# Patient Record
Sex: MALE | Race: WHITE | NOT HISPANIC OR LATINO | Employment: UNEMPLOYED | URBAN - METROPOLITAN AREA
[De-identification: names, ages, dates, MRNs, and addresses within clinical notes are randomized per-mention and may not be internally consistent; named-entity substitution may affect disease eponyms.]

---

## 2018-03-15 ENCOUNTER — OFFICE VISIT (OUTPATIENT)
Dept: URGENT CARE | Facility: CLINIC | Age: 58
End: 2018-03-15
Payer: MEDICARE

## 2018-03-15 ENCOUNTER — APPOINTMENT (OUTPATIENT)
Dept: RADIOLOGY | Facility: MEDICAL CENTER | Age: 58
DRG: 638 | End: 2018-03-15
Attending: EMERGENCY MEDICINE
Payer: MEDICARE

## 2018-03-15 ENCOUNTER — HOSPITAL ENCOUNTER (INPATIENT)
Facility: MEDICAL CENTER | Age: 58
LOS: 4 days | DRG: 638 | End: 2018-03-19
Attending: EMERGENCY MEDICINE | Admitting: INTERNAL MEDICINE
Payer: MEDICARE

## 2018-03-15 ENCOUNTER — RESOLUTE PROFESSIONAL BILLING HOSPITAL PROF FEE (OUTPATIENT)
Dept: HOSPITALIST | Facility: MEDICAL CENTER | Age: 58
End: 2018-03-15
Payer: MEDICARE

## 2018-03-15 VITALS
BODY MASS INDEX: 39.17 KG/M2 | HEART RATE: 115 BPM | TEMPERATURE: 98.3 F | OXYGEN SATURATION: 96 % | SYSTOLIC BLOOD PRESSURE: 118 MMHG | RESPIRATION RATE: 18 BRPM | HEIGHT: 75 IN | WEIGHT: 315 LBS | DIASTOLIC BLOOD PRESSURE: 74 MMHG

## 2018-03-15 DIAGNOSIS — R07.9 CHEST PAIN, UNSPECIFIED TYPE: ICD-10-CM

## 2018-03-15 DIAGNOSIS — E66.01 MORBID OBESITY WITH BMI OF 40.0-44.9, ADULT (HCC): ICD-10-CM

## 2018-03-15 DIAGNOSIS — E86.0 DEHYDRATION: ICD-10-CM

## 2018-03-15 DIAGNOSIS — I44.0 1ST DEGREE AV BLOCK: ICD-10-CM

## 2018-03-15 DIAGNOSIS — R06.02 SHORTNESS OF BREATH: ICD-10-CM

## 2018-03-15 DIAGNOSIS — R06.09 DOE (DYSPNEA ON EXERTION): ICD-10-CM

## 2018-03-15 PROBLEM — E11.10 DIABETIC KETOACIDOSIS WITHOUT COMA ASSOCIATED WITH TYPE 2 DIABETES MELLITUS (HCC): Status: ACTIVE | Noted: 2018-03-15

## 2018-03-15 PROBLEM — I10 ESSENTIAL HYPERTENSION: Status: ACTIVE | Noted: 2018-03-15

## 2018-03-15 PROBLEM — E87.1 HYPONATREMIA: Status: ACTIVE | Noted: 2018-03-15

## 2018-03-15 PROBLEM — E10.10 DIABETIC KETOACIDOSIS WITHOUT COMA ASSOCIATED WITH TYPE 1 DIABETES MELLITUS (HCC): Status: ACTIVE | Noted: 2018-03-15

## 2018-03-15 PROBLEM — D72.829 LEUKOCYTOSIS: Status: ACTIVE | Noted: 2018-03-15

## 2018-03-15 LAB
ALBUMIN SERPL BCP-MCNC: 3.8 G/DL (ref 3.2–4.9)
ALBUMIN SERPL BCP-MCNC: 4.4 G/DL (ref 3.2–4.9)
ALBUMIN/GLOB SERPL: 1.4 G/DL
ALBUMIN/GLOB SERPL: 1.6 G/DL
ALP SERPL-CCNC: 111 U/L (ref 30–99)
ALP SERPL-CCNC: 142 U/L (ref 30–99)
ALT SERPL-CCNC: 14 U/L (ref 2–50)
ALT SERPL-CCNC: 19 U/L (ref 2–50)
ANION GAP SERPL CALC-SCNC: 13 MMOL/L (ref 0–11.9)
ANION GAP SERPL CALC-SCNC: 15 MMOL/L (ref 0–11.9)
ANION GAP SERPL CALC-SCNC: 20 MMOL/L (ref 0–11.9)
AST SERPL-CCNC: 11 U/L (ref 12–45)
AST SERPL-CCNC: 9 U/L (ref 12–45)
B-OH-BUTYR SERPL-MCNC: 5.09 MMOL/L (ref 0.02–0.27)
BASOPHILS # BLD AUTO: 0.5 % (ref 0–1.8)
BASOPHILS # BLD AUTO: 0.5 % (ref 0–1.8)
BASOPHILS # BLD: 0.07 K/UL (ref 0–0.12)
BASOPHILS # BLD: 0.07 K/UL (ref 0–0.12)
BILIRUB SERPL-MCNC: 0.5 MG/DL (ref 0.1–1.5)
BILIRUB SERPL-MCNC: 0.5 MG/DL (ref 0.1–1.5)
BNP SERPL-MCNC: <2 PG/ML (ref 0–100)
BUN SERPL-MCNC: 16 MG/DL (ref 8–22)
BUN SERPL-MCNC: 16 MG/DL (ref 8–22)
BUN SERPL-MCNC: 17 MG/DL (ref 8–22)
CALCIUM SERPL-MCNC: 10.2 MG/DL (ref 8.5–10.5)
CALCIUM SERPL-MCNC: 9.5 MG/DL (ref 8.5–10.5)
CALCIUM SERPL-MCNC: 9.8 MG/DL (ref 8.5–10.5)
CHLORIDE SERPL-SCNC: 104 MMOL/L (ref 96–112)
CHLORIDE SERPL-SCNC: 106 MMOL/L (ref 96–112)
CHLORIDE SERPL-SCNC: 99 MMOL/L (ref 96–112)
CO2 SERPL-SCNC: 12 MMOL/L (ref 20–33)
CO2 SERPL-SCNC: 14 MMOL/L (ref 20–33)
CO2 SERPL-SCNC: 9 MMOL/L (ref 20–33)
CREAT SERPL-MCNC: 0.91 MG/DL (ref 0.5–1.4)
CREAT SERPL-MCNC: 0.95 MG/DL (ref 0.5–1.4)
CREAT SERPL-MCNC: 0.99 MG/DL (ref 0.5–1.4)
EKG IMPRESSION: NORMAL
EOSINOPHIL # BLD AUTO: 0.05 K/UL (ref 0–0.51)
EOSINOPHIL # BLD AUTO: 0.12 K/UL (ref 0–0.51)
EOSINOPHIL NFR BLD: 0.3 % (ref 0–6.9)
EOSINOPHIL NFR BLD: 0.9 % (ref 0–6.9)
ERYTHROCYTE [DISTWIDTH] IN BLOOD BY AUTOMATED COUNT: 39.1 FL (ref 35.9–50)
ERYTHROCYTE [DISTWIDTH] IN BLOOD BY AUTOMATED COUNT: 39.1 FL (ref 35.9–50)
EST. AVERAGE GLUCOSE BLD GHB EST-MCNC: 335 MG/DL
GLOBULIN SER CALC-MCNC: 2.7 G/DL (ref 1.9–3.5)
GLOBULIN SER CALC-MCNC: 2.8 G/DL (ref 1.9–3.5)
GLUCOSE BLD-MCNC: 122 MG/DL (ref 65–99)
GLUCOSE BLD-MCNC: 133 MG/DL (ref 65–99)
GLUCOSE BLD-MCNC: 145 MG/DL (ref 65–99)
GLUCOSE BLD-MCNC: 154 MG/DL (ref 65–99)
GLUCOSE BLD-MCNC: 180 MG/DL (ref 65–99)
GLUCOSE BLD-MCNC: 214 MG/DL (ref 65–99)
GLUCOSE BLD-MCNC: 238 MG/DL (ref 65–99)
GLUCOSE BLD-MCNC: 264 MG/DL (ref 65–99)
GLUCOSE SERPL-MCNC: 123 MG/DL (ref 65–99)
GLUCOSE SERPL-MCNC: 216 MG/DL (ref 65–99)
GLUCOSE SERPL-MCNC: 341 MG/DL (ref 65–99)
HBA1C MFR BLD: 13.3 % (ref 0–5.6)
HCT VFR BLD AUTO: 44.2 % (ref 42–52)
HCT VFR BLD AUTO: 48.3 % (ref 42–52)
HGB BLD-MCNC: 14.9 G/DL (ref 14–18)
HGB BLD-MCNC: 16.6 G/DL (ref 14–18)
IMM GRANULOCYTES # BLD AUTO: 0.07 K/UL (ref 0–0.11)
IMM GRANULOCYTES # BLD AUTO: 0.09 K/UL (ref 0–0.11)
IMM GRANULOCYTES NFR BLD AUTO: 0.5 % (ref 0–0.9)
IMM GRANULOCYTES NFR BLD AUTO: 0.6 % (ref 0–0.9)
LYMPHOCYTES # BLD AUTO: 3.72 K/UL (ref 1–4.8)
LYMPHOCYTES # BLD AUTO: 4.23 K/UL (ref 1–4.8)
LYMPHOCYTES NFR BLD: 24.6 % (ref 22–41)
LYMPHOCYTES NFR BLD: 30.6 % (ref 22–41)
MAGNESIUM SERPL-MCNC: 1.6 MG/DL (ref 1.5–2.5)
MCH RBC QN AUTO: 28.2 PG (ref 27–33)
MCH RBC QN AUTO: 29 PG (ref 27–33)
MCHC RBC AUTO-ENTMCNC: 33.7 G/DL (ref 33.7–35.3)
MCHC RBC AUTO-ENTMCNC: 34.4 G/DL (ref 33.7–35.3)
MCV RBC AUTO: 83.6 FL (ref 81.4–97.8)
MCV RBC AUTO: 84.4 FL (ref 81.4–97.8)
MONOCYTES # BLD AUTO: 0.87 K/UL (ref 0–0.85)
MONOCYTES # BLD AUTO: 1.17 K/UL (ref 0–0.85)
MONOCYTES NFR BLD AUTO: 5.8 % (ref 0–13.4)
MONOCYTES NFR BLD AUTO: 8.5 % (ref 0–13.4)
NEUTROPHILS # BLD AUTO: 10.33 K/UL (ref 1.82–7.42)
NEUTROPHILS # BLD AUTO: 8.15 K/UL (ref 1.82–7.42)
NEUTROPHILS NFR BLD: 59 % (ref 44–72)
NEUTROPHILS NFR BLD: 68.2 % (ref 44–72)
NRBC # BLD AUTO: 0 K/UL
NRBC # BLD AUTO: 0 K/UL
NRBC BLD-RTO: 0 /100 WBC
NRBC BLD-RTO: 0 /100 WBC
PHOSPHATE SERPL-MCNC: 1.7 MG/DL (ref 2.5–4.5)
PLATELET # BLD AUTO: 363 K/UL (ref 164–446)
PLATELET # BLD AUTO: 394 K/UL (ref 164–446)
PMV BLD AUTO: 10 FL (ref 9–12.9)
PMV BLD AUTO: 10.4 FL (ref 9–12.9)
POTASSIUM SERPL-SCNC: 3.6 MMOL/L (ref 3.6–5.5)
POTASSIUM SERPL-SCNC: 3.7 MMOL/L (ref 3.6–5.5)
POTASSIUM SERPL-SCNC: 4 MMOL/L (ref 3.6–5.5)
PROT SERPL-MCNC: 6.5 G/DL (ref 6–8.2)
PROT SERPL-MCNC: 7.2 G/DL (ref 6–8.2)
RBC # BLD AUTO: 5.29 M/UL (ref 4.7–6.1)
RBC # BLD AUTO: 5.72 M/UL (ref 4.7–6.1)
SODIUM SERPL-SCNC: 128 MMOL/L (ref 135–145)
SODIUM SERPL-SCNC: 131 MMOL/L (ref 135–145)
SODIUM SERPL-SCNC: 133 MMOL/L (ref 135–145)
TROPONIN I SERPL-MCNC: <0.01 NG/ML (ref 0–0.04)
TSH SERPL DL<=0.005 MIU/L-ACNC: 1.46 UIU/ML (ref 0.38–5.33)
WBC # BLD AUTO: 13.8 K/UL (ref 4.8–10.8)
WBC # BLD AUTO: 15.1 K/UL (ref 4.8–10.8)

## 2018-03-15 PROCEDURE — 99204 OFFICE O/P NEW MOD 45 MIN: CPT | Performed by: NURSE PRACTITIONER

## 2018-03-15 PROCEDURE — 71045 X-RAY EXAM CHEST 1 VIEW: CPT

## 2018-03-15 PROCEDURE — 36415 COLL VENOUS BLD VENIPUNCTURE: CPT

## 2018-03-15 PROCEDURE — 700102 HCHG RX REV CODE 250 W/ 637 OVERRIDE(OP): Performed by: EMERGENCY MEDICINE

## 2018-03-15 PROCEDURE — 80048 BASIC METABOLIC PNL TOTAL CA: CPT

## 2018-03-15 PROCEDURE — 80053 COMPREHEN METABOLIC PANEL: CPT

## 2018-03-15 PROCEDURE — 700105 HCHG RX REV CODE 258: Performed by: INTERNAL MEDICINE

## 2018-03-15 PROCEDURE — 700105 HCHG RX REV CODE 258: Performed by: EMERGENCY MEDICINE

## 2018-03-15 PROCEDURE — 83036 HEMOGLOBIN GLYCOSYLATED A1C: CPT

## 2018-03-15 PROCEDURE — 84443 ASSAY THYROID STIM HORMONE: CPT

## 2018-03-15 PROCEDURE — 93010 ELECTROCARDIOGRAM REPORT: CPT | Performed by: INTERNAL MEDICINE

## 2018-03-15 PROCEDURE — 96365 THER/PROPH/DIAG IV INF INIT: CPT

## 2018-03-15 PROCEDURE — A9270 NON-COVERED ITEM OR SERVICE: HCPCS | Performed by: EMERGENCY MEDICINE

## 2018-03-15 PROCEDURE — 82962 GLUCOSE BLOOD TEST: CPT | Mod: 91

## 2018-03-15 PROCEDURE — 700111 HCHG RX REV CODE 636 W/ 250 OVERRIDE (IP): Performed by: INTERNAL MEDICINE

## 2018-03-15 PROCEDURE — 93005 ELECTROCARDIOGRAM TRACING: CPT

## 2018-03-15 PROCEDURE — A9270 NON-COVERED ITEM OR SERVICE: HCPCS | Performed by: INTERNAL MEDICINE

## 2018-03-15 PROCEDURE — 700101 HCHG RX REV CODE 250: Performed by: INTERNAL MEDICINE

## 2018-03-15 PROCEDURE — 770022 HCHG ROOM/CARE - ICU (200)

## 2018-03-15 PROCEDURE — 71275 CT ANGIOGRAPHY CHEST: CPT

## 2018-03-15 PROCEDURE — 93005 ELECTROCARDIOGRAM TRACING: CPT | Performed by: EMERGENCY MEDICINE

## 2018-03-15 PROCEDURE — 84484 ASSAY OF TROPONIN QUANT: CPT | Mod: 91

## 2018-03-15 PROCEDURE — 99291 CRITICAL CARE FIRST HOUR: CPT

## 2018-03-15 PROCEDURE — 700111 HCHG RX REV CODE 636 W/ 250 OVERRIDE (IP): Performed by: EMERGENCY MEDICINE

## 2018-03-15 PROCEDURE — 700117 HCHG RX CONTRAST REV CODE 255: Performed by: EMERGENCY MEDICINE

## 2018-03-15 PROCEDURE — 96361 HYDRATE IV INFUSION ADD-ON: CPT

## 2018-03-15 PROCEDURE — 93005 ELECTROCARDIOGRAM TRACING: CPT | Performed by: INTERNAL MEDICINE

## 2018-03-15 PROCEDURE — 83880 ASSAY OF NATRIURETIC PEPTIDE: CPT

## 2018-03-15 PROCEDURE — 84100 ASSAY OF PHOSPHORUS: CPT

## 2018-03-15 PROCEDURE — 83735 ASSAY OF MAGNESIUM: CPT

## 2018-03-15 PROCEDURE — 700102 HCHG RX REV CODE 250 W/ 637 OVERRIDE(OP): Performed by: INTERNAL MEDICINE

## 2018-03-15 PROCEDURE — 96366 THER/PROPH/DIAG IV INF ADDON: CPT

## 2018-03-15 PROCEDURE — 82010 KETONE BODYS QUAN: CPT

## 2018-03-15 PROCEDURE — 85025 COMPLETE CBC W/AUTO DIFF WBC: CPT

## 2018-03-15 PROCEDURE — 99291 CRITICAL CARE FIRST HOUR: CPT | Performed by: INTERNAL MEDICINE

## 2018-03-15 RX ORDER — ONDANSETRON 2 MG/ML
4 INJECTION INTRAMUSCULAR; INTRAVENOUS EVERY 4 HOURS PRN
Status: DISCONTINUED | OUTPATIENT
Start: 2018-03-15 | End: 2018-03-19 | Stop reason: HOSPADM

## 2018-03-15 RX ORDER — AMOXICILLIN 250 MG
2 CAPSULE ORAL 2 TIMES DAILY
Status: DISCONTINUED | OUTPATIENT
Start: 2018-03-15 | End: 2018-03-19 | Stop reason: HOSPADM

## 2018-03-15 RX ORDER — ACETAMINOPHEN 325 MG/1
650 TABLET ORAL EVERY 6 HOURS PRN
Status: DISCONTINUED | OUTPATIENT
Start: 2018-03-15 | End: 2018-03-19 | Stop reason: HOSPADM

## 2018-03-15 RX ORDER — ASPIRIN 81 MG/1
324 TABLET, CHEWABLE ORAL DAILY
Status: DISCONTINUED | OUTPATIENT
Start: 2018-03-15 | End: 2018-03-19 | Stop reason: HOSPADM

## 2018-03-15 RX ORDER — LISINOPRIL 5 MG/1
10 TABLET ORAL EVERY EVENING
Status: DISCONTINUED | OUTPATIENT
Start: 2018-03-15 | End: 2018-03-19 | Stop reason: HOSPADM

## 2018-03-15 RX ORDER — OXYCODONE AND ACETAMINOPHEN 7.5; 325 MG/1; MG/1
1 TABLET ORAL EVERY 4 HOURS PRN
COMMUNITY
End: 2018-03-15

## 2018-03-15 RX ORDER — OXYCODONE HCL 40 MG/1
40 TABLET, FILM COATED, EXTENDED RELEASE ORAL EVERY 8 HOURS
COMMUNITY
End: 2018-05-18 | Stop reason: SDUPTHER

## 2018-03-15 RX ORDER — ASPIRIN 325 MG
325 TABLET ORAL DAILY
Status: DISCONTINUED | OUTPATIENT
Start: 2018-03-15 | End: 2018-03-19 | Stop reason: HOSPADM

## 2018-03-15 RX ORDER — SODIUM CHLORIDE 9 MG/ML
1000 INJECTION, SOLUTION INTRAVENOUS ONCE
Status: COMPLETED | OUTPATIENT
Start: 2018-03-15 | End: 2018-03-15

## 2018-03-15 RX ORDER — OXYCODONE HYDROCHLORIDE 10 MG/1
10 TABLET ORAL ONCE
Status: COMPLETED | OUTPATIENT
Start: 2018-03-15 | End: 2018-03-15

## 2018-03-15 RX ORDER — MAGNESIUM SULFATE HEPTAHYDRATE 40 MG/ML
2 INJECTION, SOLUTION INTRAVENOUS
Status: COMPLETED | OUTPATIENT
Start: 2018-03-15 | End: 2018-03-16

## 2018-03-15 RX ORDER — OMEPRAZOLE 20 MG/1
20 CAPSULE, DELAYED RELEASE ORAL
Status: DISCONTINUED | OUTPATIENT
Start: 2018-03-16 | End: 2018-03-19 | Stop reason: HOSPADM

## 2018-03-15 RX ORDER — ONDANSETRON 4 MG/1
4 TABLET, ORALLY DISINTEGRATING ORAL EVERY 4 HOURS PRN
Status: DISCONTINUED | OUTPATIENT
Start: 2018-03-15 | End: 2018-03-19 | Stop reason: HOSPADM

## 2018-03-15 RX ORDER — LISINOPRIL 10 MG/1
10 TABLET ORAL EVERY EVENING
COMMUNITY

## 2018-03-15 RX ORDER — SODIUM CHLORIDE 9 MG/ML
INJECTION, SOLUTION INTRAVENOUS CONTINUOUS
Status: DISCONTINUED | OUTPATIENT
Start: 2018-03-15 | End: 2018-03-15

## 2018-03-15 RX ORDER — ASPIRIN 300 MG/1
300 SUPPOSITORY RECTAL DAILY
Status: DISCONTINUED | OUTPATIENT
Start: 2018-03-15 | End: 2018-03-19 | Stop reason: HOSPADM

## 2018-03-15 RX ORDER — BISACODYL 10 MG
10 SUPPOSITORY, RECTAL RECTAL
Status: DISCONTINUED | OUTPATIENT
Start: 2018-03-15 | End: 2018-03-19 | Stop reason: HOSPADM

## 2018-03-15 RX ORDER — DEXTROSE AND SODIUM CHLORIDE 10; .45 G/100ML; G/100ML
INJECTION, SOLUTION INTRAVENOUS CONTINUOUS
Status: ACTIVE | OUTPATIENT
Start: 2018-03-15 | End: 2018-03-16

## 2018-03-15 RX ORDER — HYDROCODONE BITARTRATE AND ACETAMINOPHEN 5; 325 MG/1; MG/1
1 TABLET ORAL EVERY 8 HOURS PRN
COMMUNITY
End: 2018-05-18 | Stop reason: SDUPTHER

## 2018-03-15 RX ORDER — POLYETHYLENE GLYCOL 3350 17 G/17G
1 POWDER, FOR SOLUTION ORAL
Status: DISCONTINUED | OUTPATIENT
Start: 2018-03-15 | End: 2018-03-19 | Stop reason: HOSPADM

## 2018-03-15 RX ORDER — ESOMEPRAZOLE MAGNESIUM 40 MG/1
40 CAPSULE, DELAYED RELEASE ORAL
COMMUNITY

## 2018-03-15 RX ORDER — AZITHROMYCIN 250 MG/1
250-500 TABLET, FILM COATED ORAL DAILY
COMMUNITY
Start: 2018-03-02 | End: 2018-04-27

## 2018-03-15 RX ORDER — PROMETHAZINE HYDROCHLORIDE 25 MG/1
12.5-25 TABLET ORAL EVERY 4 HOURS PRN
Status: DISCONTINUED | OUTPATIENT
Start: 2018-03-15 | End: 2018-03-19 | Stop reason: HOSPADM

## 2018-03-15 RX ORDER — OXYCODONE HCL 40 MG/1
40 TABLET, FILM COATED, EXTENDED RELEASE ORAL EVERY 8 HOURS
Status: DISCONTINUED | OUTPATIENT
Start: 2018-03-15 | End: 2018-03-19 | Stop reason: HOSPADM

## 2018-03-15 RX ORDER — MAGNESIUM SULFATE HEPTAHYDRATE 40 MG/ML
4 INJECTION, SOLUTION INTRAVENOUS
Status: COMPLETED | OUTPATIENT
Start: 2018-03-15 | End: 2018-03-16

## 2018-03-15 RX ORDER — HEPARIN SODIUM 5000 [USP'U]/ML
5000 INJECTION, SOLUTION INTRAVENOUS; SUBCUTANEOUS EVERY 8 HOURS
Status: DISCONTINUED | OUTPATIENT
Start: 2018-03-15 | End: 2018-03-19 | Stop reason: HOSPADM

## 2018-03-15 RX ORDER — SODIUM CHLORIDE 450 MG/100ML
INJECTION, SOLUTION INTRAVENOUS CONTINUOUS
Status: DISCONTINUED | OUTPATIENT
Start: 2018-03-15 | End: 2018-03-16

## 2018-03-15 RX ORDER — DEXTROSE AND SODIUM CHLORIDE 5; .45 G/100ML; G/100ML
INJECTION, SOLUTION INTRAVENOUS CONTINUOUS
Status: DISCONTINUED | OUTPATIENT
Start: 2018-03-15 | End: 2018-03-16

## 2018-03-15 RX ORDER — DEXTROSE MONOHYDRATE 25 G/50ML
25 INJECTION, SOLUTION INTRAVENOUS
Status: DISCONTINUED | OUTPATIENT
Start: 2018-03-15 | End: 2018-03-16

## 2018-03-15 RX ORDER — SODIUM CHLORIDE 9 MG/ML
2000 INJECTION, SOLUTION INTRAVENOUS ONCE
Status: DISCONTINUED | OUTPATIENT
Start: 2018-03-15 | End: 2018-03-15

## 2018-03-15 RX ORDER — PROMETHAZINE HYDROCHLORIDE 25 MG/1
12.5-25 SUPPOSITORY RECTAL EVERY 4 HOURS PRN
Status: DISCONTINUED | OUTPATIENT
Start: 2018-03-15 | End: 2018-03-19 | Stop reason: HOSPADM

## 2018-03-15 RX ADMIN — DEXTROSE AND SODIUM CHLORIDE: 5; 450 INJECTION, SOLUTION INTRAVENOUS at 16:07

## 2018-03-15 RX ADMIN — OXYCODONE HYDROCHLORIDE 10 MG: 10 TABLET ORAL at 14:43

## 2018-03-15 RX ADMIN — MAGNESIUM SULFATE IN WATER 2 G: 40 INJECTION, SOLUTION INTRAVENOUS at 22:34

## 2018-03-15 RX ADMIN — DEXTROSE AND SODIUM CHLORIDE: 10; .45 INJECTION, SOLUTION INTRAVENOUS at 20:34

## 2018-03-15 RX ADMIN — SODIUM CHLORIDE 1000 ML: 9 INJECTION, SOLUTION INTRAVENOUS at 12:18

## 2018-03-15 RX ADMIN — POTASSIUM CHLORIDE 20 MEQ: 2 INJECTION, SOLUTION, CONCENTRATE INTRAVENOUS at 15:38

## 2018-03-15 RX ADMIN — SODIUM CHLORIDE 6 UNITS/HR: 9 INJECTION, SOLUTION INTRAVENOUS at 14:57

## 2018-03-15 RX ADMIN — SODIUM CHLORIDE 6 UNITS/HR: 9 INJECTION, SOLUTION INTRAVENOUS at 16:11

## 2018-03-15 RX ADMIN — IOHEXOL: 350 INJECTION, SOLUTION INTRAVENOUS at 13:18

## 2018-03-15 RX ADMIN — HEPARIN SODIUM 5000 UNITS: 5000 INJECTION, SOLUTION INTRAVENOUS; SUBCUTANEOUS at 22:17

## 2018-03-15 RX ADMIN — ASPIRIN 325 MG: 325 TABLET ORAL at 16:25

## 2018-03-15 RX ADMIN — OXYCODONE HYDROCHLORIDE 40 MG: 40 TABLET, FILM COATED, EXTENDED RELEASE ORAL at 16:25

## 2018-03-15 RX ADMIN — OXYCODONE HYDROCHLORIDE 40 MG: 40 TABLET, FILM COATED, EXTENDED RELEASE ORAL at 22:16

## 2018-03-15 RX ADMIN — LISINOPRIL 10 MG: 5 TABLET ORAL at 22:16

## 2018-03-15 RX ADMIN — POTASSIUM PHOSPHATE, MONOBASIC AND POTASSIUM PHOSPHATE, DIBASIC 30 MMOL: 224; 236 INJECTION, SOLUTION INTRAVENOUS at 23:48

## 2018-03-15 ASSESSMENT — ENCOUNTER SYMPTOMS
NECK PAIN: 0
SHORTNESS OF BREATH: 1
NAUSEA: 0
LOWER EXTREMITY EDEMA: 0
MYALGIAS: 0
MYALGIAS: 0
PND: 0
DIZZINESS: 1
HEARTBURN: 0
DEPRESSION: 0
COUGH: 1
HEMOPTYSIS: 0
HEMOPTYSIS: 0
NAUSEA: 0
WEAKNESS: 1
ORTHOPNEA: 0
CHILLS: 0
CHILLS: 0
FEVER: 0
LEG PAIN: 0
DIZZINESS: 0
COUGH: 0
FOCAL WEAKNESS: 0
LOSS OF CONSCIOUSNESS: 0
NECK PAIN: 0
WEAKNESS: 0
VOMITING: 0
BRUISES/BLEEDS EASILY: 0
FEVER: 0
BLURRED VISION: 0
VOMITING: 0
ABDOMINAL PAIN: 0
CLAUDICATION: 0
IRREGULAR HEARTBEAT: 0
SHORTNESS OF BREATH: 1
SPUTUM PRODUCTION: 0
SYNCOPE: 1
SORE THROAT: 0
PALPITATIONS: 0
EXERTIONAL CHEST PRESSURE: 1
SPUTUM PRODUCTION: 0
ABDOMINAL PAIN: 0
EYE PAIN: 0
WEIGHT LOSS: 1
PALPITATIONS: 0

## 2018-03-15 ASSESSMENT — PATIENT HEALTH QUESTIONNAIRE - PHQ9
1. LITTLE INTEREST OR PLEASURE IN DOING THINGS: NOT AT ALL
SUM OF ALL RESPONSES TO PHQ9 QUESTIONS 1 AND 2: 0
SUM OF ALL RESPONSES TO PHQ QUESTIONS 1-9: 0
2. FEELING DOWN, DEPRESSED, IRRITABLE, OR HOPELESS: NOT AT ALL

## 2018-03-15 ASSESSMENT — COPD QUESTIONNAIRES
DO YOU EVER COUGH UP ANY MUCUS OR PHLEGM?: NO/ONLY WITH OCCASIONAL COLDS OR INFECTIONS
HAVE YOU SMOKED AT LEAST 100 CIGARETTES IN YOUR ENTIRE LIFE: NO/DON'T KNOW
COPD SCREENING SCORE: 1
DURING THE PAST 4 WEEKS HOW MUCH DID YOU FEEL SHORT OF BREATH: NONE/LITTLE OF THE TIME

## 2018-03-15 ASSESSMENT — LIFESTYLE VARIABLES
ALCOHOL_USE: NO
EVER_SMOKED: NEVER

## 2018-03-15 ASSESSMENT — PAIN SCALES - GENERAL
PAINLEVEL_OUTOF10: 3

## 2018-03-15 NOTE — ED NOTES
Med rec complete per Pt at bedside  Allergies reviewed  Pt completed a 5 day course of Azithromycin on 3/6

## 2018-03-15 NOTE — ED TRIAGE NOTES
Patient noted chest pain for the last two weeks, comes in today because it just won't go away, went to urgent care and sent here for further evaluation and work up.  EKG complete.  History of diabetes.  Positive nausea, pressure does not radiate, no vomiting.  No numbness, no tingling.

## 2018-03-15 NOTE — ED PROVIDER NOTES
"ED Provider Note    CHIEF COMPLAINT  Chief Complaint   Patient presents with   • Chest Pain       HPI  Dakota Cisneros is a 57 y.o. male with history of diabetes and hypertension who presents complaining of chest pain and shortness of breath.    Patient states he was walking up a neighbors flight of stairs with a box weighing approximately 50 pounds several weeks ago when he developed acute onset of shortness of breath approximately three quarters of the way up the flight. After resting for 5-10 minutes, his symptoms resolved but he noted that he had had urinary incontinence and near syncope prior to this. Following day he noted a constant, dull substernal chest pressure that was nonradiating and has been intermittent since. Patient reports dyspnea on exertion with fatigue that has been worsening and also accompanied by nausea. He notes a mild cough that is occasionally productive of yellowish green sputum and \"specks\" of possible blood.     Patient reports a 20 pound weight loss in the last month that is unintentional and states his appetite has been decreased due to nausea.  Reports feeling \"out of it.\"    Patient denies calf pain, leg swelling, orthopnea, PND, sick contacts, fever, chills, vomiting, diarrhea.      ALLERGIES  No Known Allergies    CURRENT MEDICATIONS  Home Medications     Reviewed by Harley Hargrove R.N. (Registered Nurse) on 03/15/18 at 1032  Med List Status: Complete   Medication Last Dose Status   Esomeprazole Magnesium (NEXIUM PO)  Active   HYDROcodone-acetaminophen (NORCO) 5-325 MG Tab per tablet  Active   oxyCODONE-acetaminophen (PERCOCET) 7.5-325 MG per tablet  Active                PAST MEDICAL HISTORY     Diabetes  Hypertension    SURGICAL HISTORY  patient denies any surgical history    SOCIAL HISTORY  Social History     Social History Main Topics   • Smoking status: Never Smoker   • Smokeless tobacco: Never Used   • Alcohol use Not on file   • Drug use: Unknown   • Sexual activity: Not on " "file       Family Hx:  No history of PE/DVT or ACS    REVIEW OF SYSTEMS  See HPI for further details.  All other systems are negative except as above in HPI.      PHYSICAL EXAM  VITAL SIGNS: /81   Pulse (!) 115   Temp 36.7 °C (98 °F)   Resp 20   Ht 1.905 m (6' 3\")   Wt (!) 136.8 kg (301 lb 9.4 oz)   SpO2 100%   BMI 37.70 kg/m²     General:  WDoverweight, nontoxic appearing in NAD; A+Ox3; V/S as above  Skin: warm and dry; good color; no rash  HEENT: NCAT; EOMs intact; PERRL; no scleral icterus   Neck: FROM; no meningismus, no JVD  Cardiovascular: Tachy heart rate and regular rhythm.  No murmurs, rubs, or gallops; pulses 2+ bilaterally radially  Lungs: Clear to auscultation with good air movement bilaterally.  No wheezes, rhonchi, or rales.   Abdomen: BS present; soft; NTND; no rebound, guarding, or rigidity.  No organomegaly or pulsatile mass  Extremities: VENEGAS x 4; no e/o trauma; no pedal edema; neg Judd's  Neurologic: CNs III-XII grossly intact; speech clear; distal sensation intact; strength 5/5 UE/LEs  Psychiatric: Appropriate affect, normal mood    LABS  Results for orders placed or performed during the hospital encounter of 03/15/18   CBC WITH DIFFERENTIAL   Result Value Ref Range    WBC 15.1 (H) 4.8 - 10.8 K/uL    RBC 5.72 4.70 - 6.10 M/uL    Hemoglobin 16.6 14.0 - 18.0 g/dL    Hematocrit 48.3 42.0 - 52.0 %    MCV 84.4 81.4 - 97.8 fL    MCH 29.0 27.0 - 33.0 pg    MCHC 34.4 33.7 - 35.3 g/dL    RDW 39.1 35.9 - 50.0 fL    Platelet Count 394 164 - 446 K/uL    MPV 10.4 9.0 - 12.9 fL    Neutrophils-Polys 68.20 44.00 - 72.00 %    Lymphocytes 24.60 22.00 - 41.00 %    Monocytes 5.80 0.00 - 13.40 %    Eosinophils 0.30 0.00 - 6.90 %    Basophils 0.50 0.00 - 1.80 %    Immature Granulocytes 0.60 0.00 - 0.90 %    Nucleated RBC 0.00 /100 WBC    Neutrophils (Absolute) 10.33 (H) 1.82 - 7.42 K/uL    Lymphs (Absolute) 3.72 1.00 - 4.80 K/uL    Monos (Absolute) 0.87 (H) 0.00 - 0.85 K/uL    Eos (Absolute) 0.05 0.00 - " 0.51 K/uL    Baso (Absolute) 0.07 0.00 - 0.12 K/uL    Immature Granulocytes (abs) 0.09 0.00 - 0.11 K/uL    NRBC (Absolute) 0.00 K/uL   TROPONIN   Result Value Ref Range    Troponin I <0.01 0.00 - 0.04 ng/mL   BNP   Result Value Ref Range    B Natriuretic Peptide <2 0 - 100 pg/mL   EKG (ER)   Result Value Ref Range    Report       [facility]    Test Date:  2018-03-15  Pt Name:    CAROLYN CRAIG                 Department: ER  MRN:        [id number]                      Room:  Gender:     M                            Technician: [id number]  :        1960                   Requested By:ER TRIAGE PROTOCOL  Order #:    [id number]                    Reading MD:    Measurements  Intervals                                Axis  Rate:       107                          P:          0  PA:         150                          QRS:        30  QRSD:       100                          T:          -73  QT:         305  QTc:        407    Interpretive Statements  SINUS TACHYCARDIA  EARLY PRECORDIAL R/S TRANSITION  NONSPECIFIC T ABNORMALITIES, INFERIOR LEADS  BASELINE WANDER IN LEAD(S) V1  No previous ECG available for comparison         EKG  12 Lead EKG obtained and interpreted by me to show:  Rhythm: Sinus tach  Rate: 107  Intervals:   PA: 150   QRS: 100   QTC: 407   All intervals are within normal limits  No ectopy  Normal axis  No ST changes  Clinical Impression: sinus tach with no acute ST changes  Compared to previous EKG dated earlier today which shows sinus tach/significant change  EKG has been confirmed in [facility]       IMAGING  CT-CTA CHEST PULMONARY ARTERY W/ RECONS   Final Result      No central or segmental pulmonary embolus or evidence of other acute thoracic pathology            DX-CHEST-PORTABLE (1 VIEW)   Final Result      No acute cardiopulmonary process is seen.        MEDICAL RECORD  I have reviewed patient's medical record and pertinent results are listed below.      COURSE &  MEDICAL DECISION MAKING  I have reviewed any medical record information, laboratory studies and radiographic results as noted.    Dakota Cisneros is a 57 y.o. Male with h/o DM who presents complaining of SOB/MENDEZ, CP, weight loss and fatigue.    EKG at  demonstrated first degree AV block per report    EKG here shows no block and no STEMI    CXR negative    ASA given for possible ACS    CT ordered for possible PE    12:04 PM  Called with CO2 of 9  NS bolus ordered for dehydration    Remainder of labs demonstrate DKA with glucose of 300s and AG 20.    1:46 PM  Paging [facility]    14:00  [facility] capped  Discussed with hospitalist/Margarita who agrees to admit the pt   Insulin infusion ordered verbally with pharmacist    The total critical care time on this patient is 40 minutes, resuscitating patient, speaking with admitting physician, and deciphering test results. This 40 minutes is exclusive of separately billable procedures.        FINAL IMPRESSION  1. Chest pain, unspecified type    2. MENDEZ (dyspnea on exertion)    3. Dehydration    4. DKA    Electronically signed by: Tammy Champagne, 3/15/2018 10:22 AM

## 2018-03-15 NOTE — PROGRESS NOTES
Subjective:     Dakota Cisneros is a 57 y.o. male who presents for Other (pt states that about 2 weeks ago he was climbing some stairs and states he nearly passed out and states he has been having chest pain since then. pt states he has not being diagnosed with asthma and states that when he was a kid he used to get short of breath when running)  Patient presents today with complaints of chest pains ×2 weeks. Patient was climbing some stairs 2 weeks ago and had sudden onset chest pain with shortness of breath. Patient states it feels like something heavy on his chest. Patient felt at the time as if he was going to pass out. Patient feels dizzy, lightheaded  with any exertion. Patient states he has been r avoiding any exertion ×2 weeks.. Patient states he is having acid reflux.. Patient states pain does not radiate. Patient denies any leg swelling, calf cramping.      Chest Pain    This is a new problem. Episode onset: 2 weeks. The onset quality is sudden. The problem occurs constantly. The problem has been unchanged. The pain is present in the substernal region. The pain is at a severity of 9/10. The pain is severe. The quality of the pain is described as pressure, tightness and heavy. The pain does not radiate. Associated symptoms include a cough, dizziness, exertional chest pressure, malaise/fatigue, shortness of breath and syncope. Pertinent negatives include no abdominal pain, claudication, fever, hemoptysis, irregular heartbeat, leg pain, lower extremity edema, nausea, orthopnea, palpitations, PND, sputum production, vomiting or weakness. The cough's precipitants include activity. The cough is non-productive. There is no color change associated with the cough. The cough is relieved by rest. The cough is worsened by activity. The pain is aggravated by exertion. He has tried rest for the symptoms. The treatment provided no relief. Risk factors include lack of exercise, male gender, obesity and sedentary  lifestyle.   Pertinent negatives for past medical history include no CAD, no DVT, no hypertension and no MI.   Shortness of Breath   This is a new problem. Episode onset: 2 weeks. The problem occurs constantly. The problem has been unchanged. Associated symptoms include chest pain and syncope. Pertinent negatives include no abdominal pain, claudication, fever, hemoptysis, leg pain, leg swelling, neck pain, orthopnea, PND, rash, sore throat, sputum production or vomiting. The symptoms are aggravated by any activity. The patient has no known risk factors for DVT/PE. He has tried nothing for the symptoms. The treatment provided no relief. There is no history of CAD, DVT or a heart failure.   No past medical history on file.No past surgical history on file.  Social History     Social History   • Marital status: Single     Spouse name: N/A   • Number of children: N/A   • Years of education: N/A     Occupational History   • Not on file.     Social History Main Topics   • Smoking status: Never Smoker   • Smokeless tobacco: Never Used   • Alcohol use Not on file   • Drug use: Unknown   • Sexual activity: Not on file     Other Topics Concern   • Not on file     Social History Narrative   • No narrative on file    No family history on file. Review of Systems   Constitutional: Positive for malaise/fatigue. Negative for chills and fever.   HENT: Negative for sore throat.    Eyes: Negative for pain.   Respiratory: Positive for cough and shortness of breath. Negative for hemoptysis and sputum production.    Cardiovascular: Positive for chest pain and syncope. Negative for palpitations, orthopnea, claudication, leg swelling and PND.   Gastrointestinal: Negative for abdominal pain, nausea and vomiting.   Genitourinary: Negative for hematuria.   Musculoskeletal: Negative for myalgias and neck pain.   Skin: Negative for rash.   Neurological: Positive for dizziness. Negative for weakness.   No Known Allergies   Objective:   /74   " Pulse (!) 115   Temp 36.8 °C (98.3 °F)   Resp 18   Ht 1.905 m (6' 3\")   Wt (!) 145.6 kg (321 lb)   SpO2 96%   BMI 40.12 kg/m²   Physical Exam   Constitutional: He is oriented to person, place, and time. He appears well-developed and well-nourished. No distress.   HENT:   Head: Normocephalic and atraumatic.   Right Ear: Tympanic membrane normal.   Left Ear: Tympanic membrane normal.   Nose: Nose normal. Right sinus exhibits no maxillary sinus tenderness and no frontal sinus tenderness. Left sinus exhibits no maxillary sinus tenderness and no frontal sinus tenderness.   Mouth/Throat: Uvula is midline, oropharynx is clear and moist and mucous membranes are normal. No posterior oropharyngeal edema, posterior oropharyngeal erythema or tonsillar abscesses. No tonsillar exudate.   Eyes: Conjunctivae and EOM are normal. Pupils are equal, round, and reactive to light. Right eye exhibits no discharge. Left eye exhibits no discharge.   Cardiovascular: Regular rhythm, normal heart sounds and normal pulses.  Tachycardia present.  PMI is not displaced.    No murmur heard.  Pulmonary/Chest: Effort normal and breath sounds normal. No respiratory distress.   Abdominal: Soft. He exhibits no distension. There is no tenderness.   Neurological: He is alert and oriented to person, place, and time. He has normal reflexes. No sensory deficit.   Skin: Skin is warm, dry and intact.   Psychiatric: He has a normal mood and affect.   Vitals reviewed.        Assessment/Plan:   Assessment    1. Chest pain, unspecified type  EKG - Clinic performed   2. 1st degree AV block     3. Shortness of breath     4. Morbid obesity with BMI of 40.0-44.9, adult (CMS-[facility])       - EKG - Clinic performed   tachycardia rate 105, first-degree AV block    It is my recommendation that the patient be evaluated in the emergency room for cardiac workup. Patient having persistent chest pain ×2 weeks with shortness of breath, and abnormal EKG showing first degree " heart block. Patient has no cardiac history. Patient denies tobacco use.     Patient declining [facility] patient will travel personal vehicle to [facility] ER for further evaluation and management of chest pain, first-degree block.    Discussed patient’s case with attending physician, Dr. Champagne, regarding transfer of care to the emergency department as medically necessary for more definitive and emergent care.  Doctor agreed to transfer of care.

## 2018-03-15 NOTE — ED TRIAGE NOTES
"Patient reports chest pain for the past 2 weeks, feels like \"someone is kneeing his chest\"  Feels short of breath at rest and on exertion.  Pain 3/10.  Recently on a z-pack for elevated white count.  Reports productive cough with white sputum.  A/Ox4.  Steady on feet.  History of dm, htn, chronic pain.  "

## 2018-03-16 LAB
ANION GAP SERPL CALC-SCNC: 10 MMOL/L (ref 0–11.9)
ANION GAP SERPL CALC-SCNC: 11 MMOL/L (ref 0–11.9)
ANION GAP SERPL CALC-SCNC: 11 MMOL/L (ref 0–11.9)
BASOPHILS # BLD AUTO: 0.6 % (ref 0–1.8)
BASOPHILS # BLD: 0.06 K/UL (ref 0–0.12)
BUN SERPL-MCNC: 15 MG/DL (ref 8–22)
BUN SERPL-MCNC: 15 MG/DL (ref 8–22)
BUN SERPL-MCNC: 16 MG/DL (ref 8–22)
CALCIUM SERPL-MCNC: 9.2 MG/DL (ref 8.5–10.5)
CALCIUM SERPL-MCNC: 9.4 MG/DL (ref 8.5–10.5)
CALCIUM SERPL-MCNC: 9.6 MG/DL (ref 8.5–10.5)
CHLORIDE SERPL-SCNC: 105 MMOL/L (ref 96–112)
CHLORIDE SERPL-SCNC: 105 MMOL/L (ref 96–112)
CHLORIDE SERPL-SCNC: 106 MMOL/L (ref 96–112)
CHOLEST SERPL-MCNC: 131 MG/DL (ref 100–199)
CO2 SERPL-SCNC: 16 MMOL/L (ref 20–33)
CO2 SERPL-SCNC: 17 MMOL/L (ref 20–33)
CO2 SERPL-SCNC: 17 MMOL/L (ref 20–33)
CREAT SERPL-MCNC: 0.81 MG/DL (ref 0.5–1.4)
CREAT SERPL-MCNC: 0.85 MG/DL (ref 0.5–1.4)
CREAT SERPL-MCNC: 0.88 MG/DL (ref 0.5–1.4)
EKG IMPRESSION: NORMAL
EOSINOPHIL # BLD AUTO: 0.2 K/UL (ref 0–0.51)
EOSINOPHIL NFR BLD: 1.9 % (ref 0–6.9)
ERYTHROCYTE [DISTWIDTH] IN BLOOD BY AUTOMATED COUNT: 39.9 FL (ref 35.9–50)
GLUCOSE BLD-MCNC: 124 MG/DL (ref 65–99)
GLUCOSE BLD-MCNC: 124 MG/DL (ref 65–99)
GLUCOSE BLD-MCNC: 127 MG/DL (ref 65–99)
GLUCOSE BLD-MCNC: 132 MG/DL (ref 65–99)
GLUCOSE BLD-MCNC: 133 MG/DL (ref 65–99)
GLUCOSE BLD-MCNC: 134 MG/DL (ref 65–99)
GLUCOSE BLD-MCNC: 136 MG/DL (ref 65–99)
GLUCOSE BLD-MCNC: 140 MG/DL (ref 65–99)
GLUCOSE BLD-MCNC: 146 MG/DL (ref 65–99)
GLUCOSE BLD-MCNC: 161 MG/DL (ref 65–99)
GLUCOSE BLD-MCNC: 186 MG/DL (ref 65–99)
GLUCOSE BLD-MCNC: 220 MG/DL (ref 65–99)
GLUCOSE BLD-MCNC: 225 MG/DL (ref 65–99)
GLUCOSE BLD-MCNC: 319 MG/DL (ref 65–99)
GLUCOSE SERPL-MCNC: 136 MG/DL (ref 65–99)
GLUCOSE SERPL-MCNC: 142 MG/DL (ref 65–99)
GLUCOSE SERPL-MCNC: 143 MG/DL (ref 65–99)
HCT VFR BLD AUTO: 42.1 % (ref 42–52)
HDLC SERPL-MCNC: 23 MG/DL
HGB BLD-MCNC: 14.6 G/DL (ref 14–18)
IMM GRANULOCYTES # BLD AUTO: 0.05 K/UL (ref 0–0.11)
IMM GRANULOCYTES NFR BLD AUTO: 0.5 % (ref 0–0.9)
LDLC SERPL CALC-MCNC: 74 MG/DL
LYMPHOCYTES # BLD AUTO: 2.87 K/UL (ref 1–4.8)
LYMPHOCYTES NFR BLD: 26.6 % (ref 22–41)
MAGNESIUM SERPL-MCNC: 1.8 MG/DL (ref 1.5–2.5)
MAGNESIUM SERPL-MCNC: 2 MG/DL (ref 1.5–2.5)
MCH RBC QN AUTO: 29.1 PG (ref 27–33)
MCHC RBC AUTO-ENTMCNC: 34.7 G/DL (ref 33.7–35.3)
MCV RBC AUTO: 84 FL (ref 81.4–97.8)
MONOCYTES # BLD AUTO: 0.97 K/UL (ref 0–0.85)
MONOCYTES NFR BLD AUTO: 9 % (ref 0–13.4)
NEUTROPHILS # BLD AUTO: 6.62 K/UL (ref 1.82–7.42)
NEUTROPHILS NFR BLD: 61.4 % (ref 44–72)
NRBC # BLD AUTO: 0 K/UL
NRBC BLD-RTO: 0 /100 WBC
PHOSPHATE SERPL-MCNC: 2.4 MG/DL (ref 2.5–4.5)
PLATELET # BLD AUTO: 329 K/UL (ref 164–446)
PMV BLD AUTO: 10.2 FL (ref 9–12.9)
POTASSIUM SERPL-SCNC: 3.4 MMOL/L (ref 3.6–5.5)
POTASSIUM SERPL-SCNC: 3.6 MMOL/L (ref 3.6–5.5)
POTASSIUM SERPL-SCNC: 3.7 MMOL/L (ref 3.6–5.5)
RBC # BLD AUTO: 5.01 M/UL (ref 4.7–6.1)
SODIUM SERPL-SCNC: 132 MMOL/L (ref 135–145)
SODIUM SERPL-SCNC: 133 MMOL/L (ref 135–145)
SODIUM SERPL-SCNC: 133 MMOL/L (ref 135–145)
TRIGL SERPL-MCNC: 172 MG/DL (ref 0–149)
WBC # BLD AUTO: 10.8 K/UL (ref 4.8–10.8)

## 2018-03-16 PROCEDURE — A9270 NON-COVERED ITEM OR SERVICE: HCPCS | Performed by: INTERNAL MEDICINE

## 2018-03-16 PROCEDURE — 700105 HCHG RX REV CODE 258: Performed by: INTERNAL MEDICINE

## 2018-03-16 PROCEDURE — 99233 SBSQ HOSP IP/OBS HIGH 50: CPT | Performed by: INTERNAL MEDICINE

## 2018-03-16 PROCEDURE — 82962 GLUCOSE BLOOD TEST: CPT | Mod: 91

## 2018-03-16 PROCEDURE — 700111 HCHG RX REV CODE 636 W/ 250 OVERRIDE (IP): Performed by: INTERNAL MEDICINE

## 2018-03-16 PROCEDURE — 80061 LIPID PANEL: CPT

## 2018-03-16 PROCEDURE — 700102 HCHG RX REV CODE 250 W/ 637 OVERRIDE(OP): Performed by: INTERNAL MEDICINE

## 2018-03-16 PROCEDURE — 83735 ASSAY OF MAGNESIUM: CPT

## 2018-03-16 PROCEDURE — 84100 ASSAY OF PHOSPHORUS: CPT

## 2018-03-16 PROCEDURE — 770022 HCHG ROOM/CARE - ICU (200)

## 2018-03-16 PROCEDURE — 80048 BASIC METABOLIC PNL TOTAL CA: CPT

## 2018-03-16 PROCEDURE — 85025 COMPLETE CBC W/AUTO DIFF WBC: CPT

## 2018-03-16 RX ORDER — POTASSIUM CHLORIDE 7.45 MG/ML
10 INJECTION INTRAVENOUS
Status: COMPLETED | OUTPATIENT
Start: 2018-03-16 | End: 2018-03-16

## 2018-03-16 RX ORDER — SODIUM CHLORIDE, SODIUM LACTATE, POTASSIUM CHLORIDE, CALCIUM CHLORIDE 600; 310; 30; 20 MG/100ML; MG/100ML; MG/100ML; MG/100ML
1000 INJECTION, SOLUTION INTRAVENOUS ONCE
Status: COMPLETED | OUTPATIENT
Start: 2018-03-16 | End: 2018-03-16

## 2018-03-16 RX ORDER — HYDROCODONE BITARTRATE AND ACETAMINOPHEN 5; 325 MG/1; MG/1
1-2 TABLET ORAL EVERY 4 HOURS PRN
Status: DISCONTINUED | OUTPATIENT
Start: 2018-03-16 | End: 2018-03-16

## 2018-03-16 RX ORDER — HYDROCODONE BITARTRATE AND ACETAMINOPHEN 5; 325 MG/1; MG/1
1-2 TABLET ORAL EVERY 8 HOURS PRN
Status: DISCONTINUED | OUTPATIENT
Start: 2018-03-16 | End: 2018-03-19 | Stop reason: HOSPADM

## 2018-03-16 RX ORDER — DEXTROSE MONOHYDRATE 25 G/50ML
25 INJECTION, SOLUTION INTRAVENOUS
Status: DISCONTINUED | OUTPATIENT
Start: 2018-03-16 | End: 2018-03-17

## 2018-03-16 RX ORDER — POTASSIUM CHLORIDE 20 MEQ/1
40 TABLET, EXTENDED RELEASE ORAL ONCE
Status: COMPLETED | OUTPATIENT
Start: 2018-03-16 | End: 2018-03-16

## 2018-03-16 RX ADMIN — INSULIN HUMAN 10 UNITS: 100 INJECTION, SUSPENSION SUBCUTANEOUS at 13:06

## 2018-03-16 RX ADMIN — ASPIRIN 325 MG: 325 TABLET ORAL at 08:12

## 2018-03-16 RX ADMIN — SODIUM CHLORIDE, POTASSIUM CHLORIDE, SODIUM LACTATE AND CALCIUM CHLORIDE 1000 ML: 600; 310; 30; 20 INJECTION, SOLUTION INTRAVENOUS at 12:51

## 2018-03-16 RX ADMIN — SODIUM CHLORIDE 6 UNITS/HR: 9 INJECTION, SOLUTION INTRAVENOUS at 01:08

## 2018-03-16 RX ADMIN — INSULIN HUMAN 3 UNITS: 100 INJECTION, SOLUTION PARENTERAL at 17:43

## 2018-03-16 RX ADMIN — OMEPRAZOLE 20 MG: 20 CAPSULE, DELAYED RELEASE ORAL at 06:06

## 2018-03-16 RX ADMIN — HEPARIN SODIUM 5000 UNITS: 5000 INJECTION, SOLUTION INTRAVENOUS; SUBCUTANEOUS at 22:32

## 2018-03-16 RX ADMIN — POTASSIUM CHLORIDE 10 MEQ: 7.46 INJECTION, SOLUTION INTRAVENOUS at 08:13

## 2018-03-16 RX ADMIN — POTASSIUM CHLORIDE 40 MEQ: 1500 TABLET, EXTENDED RELEASE ORAL at 16:10

## 2018-03-16 RX ADMIN — HEPARIN SODIUM 5000 UNITS: 5000 INJECTION, SOLUTION INTRAVENOUS; SUBCUTANEOUS at 06:06

## 2018-03-16 RX ADMIN — SODIUM CHLORIDE 6 UNITS/HR: 9 INJECTION, SOLUTION INTRAVENOUS at 12:44

## 2018-03-16 RX ADMIN — HEPARIN SODIUM 5000 UNITS: 5000 INJECTION, SOLUTION INTRAVENOUS; SUBCUTANEOUS at 14:01

## 2018-03-16 RX ADMIN — OXYCODONE HYDROCHLORIDE 40 MG: 40 TABLET, FILM COATED, EXTENDED RELEASE ORAL at 16:10

## 2018-03-16 RX ADMIN — INSULIN HUMAN 6 UNITS: 100 INJECTION, SOLUTION PARENTERAL at 20:40

## 2018-03-16 RX ADMIN — POTASSIUM CHLORIDE 10 MEQ: 7.46 INJECTION, SOLUTION INTRAVENOUS at 11:35

## 2018-03-16 RX ADMIN — LISINOPRIL 10 MG: 5 TABLET ORAL at 20:37

## 2018-03-16 RX ADMIN — INSULIN HUMAN 10 UNITS: 100 INJECTION, SUSPENSION SUBCUTANEOUS at 17:42

## 2018-03-16 RX ADMIN — POTASSIUM CHLORIDE 10 MEQ: 7.46 INJECTION, SOLUTION INTRAVENOUS at 09:12

## 2018-03-16 RX ADMIN — OXYCODONE HYDROCHLORIDE 40 MG: 40 TABLET, FILM COATED, EXTENDED RELEASE ORAL at 22:33

## 2018-03-16 RX ADMIN — OXYCODONE HYDROCHLORIDE 40 MG: 40 TABLET, FILM COATED, EXTENDED RELEASE ORAL at 06:06

## 2018-03-16 RX ADMIN — POTASSIUM CHLORIDE 10 MEQ: 7.46 INJECTION, SOLUTION INTRAVENOUS at 10:33

## 2018-03-16 ASSESSMENT — LIFESTYLE VARIABLES: DO YOU DRINK ALCOHOL: NO

## 2018-03-16 ASSESSMENT — ENCOUNTER SYMPTOMS
CHILLS: 0
STRIDOR: 0
CONSTIPATION: 0
FALLS: 0
DIARRHEA: 0
SPUTUM PRODUCTION: 0
NAUSEA: 0
ABDOMINAL PAIN: 0
DEPRESSION: 0
MYALGIAS: 0
TINGLING: 0
PALPITATIONS: 0
FEVER: 0
HEADACHES: 0
SENSORY CHANGE: 0
LOSS OF CONSCIOUSNESS: 0
FOCAL WEAKNESS: 0
DIZZINESS: 0
WEAKNESS: 0
COUGH: 0
HEARTBURN: 1
SHORTNESS OF BREATH: 1
VOMITING: 0

## 2018-03-16 ASSESSMENT — PAIN SCALES - GENERAL
PAINLEVEL_OUTOF10: 3
PAINLEVEL_OUTOF10: 2
PAINLEVEL_OUTOF10: 3
PAINLEVEL_OUTOF10: 3
PAINLEVEL_OUTOF10: 7
PAINLEVEL_OUTOF10: 0
PAINLEVEL_OUTOF10: 2
PAINLEVEL_OUTOF10: 2
PAINLEVEL_OUTOF10: 4

## 2018-03-16 ASSESSMENT — PATIENT HEALTH QUESTIONNAIRE - PHQ9
SUM OF ALL RESPONSES TO PHQ9 QUESTIONS 1 AND 2: 0
2. FEELING DOWN, DEPRESSED, IRRITABLE, OR HOPELESS: NOT AT ALL
1. LITTLE INTEREST OR PLEASURE IN DOING THINGS: NOT AT ALL
SUM OF ALL RESPONSES TO PHQ QUESTIONS 1-9: 0

## 2018-03-16 NOTE — PROGRESS NOTES
K is 3.6 and phos is 1.7. Per Xander, Pharmacist, give potassium phosphate 30 mmol in  IVPB at 125 mL/hr and do not replace potassium otherwise at this time.

## 2018-03-16 NOTE — CONSULTS
DATE OF SERVICE:  03/15/2018    REQUESTING PHYSICIAN:  Randell Avila MD    REASON FOR CONSULTATION:  Diabetic ketoacidosis.    HISTORY OF PRESENT ILLNESS:  The patient is a 57-year-old male with past   medical history significant for obesity, hypertension and type 2 diabetes, who   presented to urgent care today complaining of 2 weeks of intermittent chest   pain, described as pressure like with associated shortness of breath and   nausea.  He also was noted to have intermittent productive cough of white   sputum, poor appetite and unintentional 20-pound weight loss.  He had recently   been treated with a Z-Sameer for bronchitis.  Given his symptoms, he was sent   from the urgent care to [facility] Emergency Department where he was found to be   in diabetic ketoacidosis.  His EKG was nonspecific and his troponins have been   negative x3.  He is being admitted to the intensive care unit for DKA   management and I was consulted for assistance in his critical care management.    PAST MEDICAL HISTORY:  1.  Type 2 diabetes.  2.  Chronic pain, on prescription opiates.  3.  Gastroesophageal reflux disease.  4.  Systemic arterial hypertension.  5.  Obesity.    OUTPATIENT MEDICATIONS:  Include Nexium, Norco, Percocet and some reported   insulin, although details are unclear.    PAST SURGICAL HISTORY:  None.    FAMILY HISTORY:  Remarkable for hypertension, negative for acute coronary   syndrome and PE.    SOCIAL HISTORY:  Negative for alcohol, tobacco, and illicit drugs.    REVIEW OF SYSTEMS:  Please see history present illness, otherwise no recent   sick contacts, travel, leg swelling, or syncope.  Remainder review of systems   is negative.    PHYSICAL EXAMINATION:  VITAL SIGNS:  Temperature afebrile, heart rate 99, respiratory rate of 18 with   an oxygen saturation 94% on room air, and blood pressure 114/64.  GENERAL:  Obese male, resting is in no acute distress.  HEENT:  Normocephalic, atraumatic.  Pupils are equal, round  and reactive to   light without scleral icterus or conjunctival pallor.  Dry oral mucosal   membranes.  NECK:  Supple.  Trachea is midline.  There is no stridor or jugular venous   distention.  CARDIOVASCULAR:  Tachycardic without murmurs, rubs or gallops and nondisplaced   PMI, although distant heart sounds.  Radial pulses are 2+ and symmetric with   brisk capillary refill.  PULMONARY:  Clear to auscultation bilaterally without wheezing, rhonchi, or   rales.  Breathing without accessory muscle use and is sonorous while sleeping.  ABDOMEN:  Obese, but soft, nontender and nondistended.  Positive bowel sounds.  GENITOURINARY:  Deferred.  EXTREMITIES:  No clubbing, cyanosis or edema.  No calf asymmetry or palpable   cord.  Negative Homans sign.  NEUROLOGIC:  Alert and oriented x4.  Cranial nerves II-XII are intact.  No   focal deficits.  SKIN:  Warm, pink and dry without lesions or rash, brisk capillary refill.    LABORATORY DATA:  CBC with white count of 14,000, hemoglobin of 15, platelets   of 363 with a normal differential.  Chemistry:  Sodium 131, potassium of 3.7,   chloride of 104, bicarbonate of 12 up from 9, BUN of 16, creatinine 0.95 and   glucose of 216 down from 341 and anion gap of 15 down from 20.  LFTs within   normal limits except for alkaline phosphatase of 111.  Phosphorus is 1.7,   magnesium of 1.6.  Glycohemoglobin of 13.  Troponin less than 0.01 x3.  Brain   natriuretic peptide less than 2.  Beta hydroxybutyric acid is 5.1.  TSH 1.46.    IMAGIN.  Chest x-ray showing no acute cardiopulmonary process.  2.  EKG, normal sinus rhythm with a rate of 8-9 with first-degree AV block and   nonspecific T-wave abnormalities in the anterior leads, unchanged on repeat   EKGs.  3.  CT angio of the chest showing no evidence of pulmonary embolism or other   acute thoracic pathology.    ASSESSMENT:  1.  Acute diabetic ketoacidosis, likely secondary to either recent viral   bronchitis versus medication  noncompliance.  2.  Obesity.  3.  Systemic arterial hypertension.  4.  Chronic pain.  5.  Chest pain.  6.  Intravascular volume depletion.  7.  Hypophosphatemia.  8.  Hypomagnesemia.  9.  Hyponatremia.    PLAN:  Patient is critically ill at this time and will be admitted to the   intensive care unit for close observation and management.  I will continue him   on the DKA protocol including insulin drip, aggressive fluid resuscitation   and close monitoring of his electrolytes, anion gap and serum bicarbonate   level.  Once he has reached goal on his glucose control, correction of   acidosis, then we will transition him to long and short-acting insulin and   discontinue the insulin drip 2 hours later.  He will be kept n.p.o. in the   meantime.  His electrolytes will be replaced and monitored closely.  His pain   will be controlled with analgesics.  Patient would most likely benefit from a   cardiac stress test before discharge given his risk factors for acute coronary   syndrome and his chest pain, although it is somewhat reassuring evident   troponins negative x3 and no EKG changes.  At this time, no evidence of   bacterial infection, we will hold off on antibiotics.  Diabetic education as   well as weight loss is encouraged and he will be kept on DVT prophylaxis per   ICU protocol.    Patient is critically ill at this time and I appreciate the opportunity to   assist in his care and will continue to follow along closely with you.    Critical care time 31 minutes.  No time overlap.  Procedures are not included   in this time.       ____________________________________     Jeremy Gonda, MD JG / LISA    DD:  03/15/2018 23:39:32  DT:  03/16/2018 00:04:23    D#:  [id number]  Job#:  [id number]

## 2018-03-16 NOTE — H&P
HOSPITAL MEDICINE HISTORY/ PHYSICAL    Date of Service:  3/15/2018   6:43 PM       Patient ID:   Name: Dakota Cisneros YOB: 1960. Age: 57 y.o. male. MRN: [id number]    Admitting Attending:  Randell Avila     PCP : Pcp Pt States None          Chief Complaint:       Chest pain and shortness of breath    History of Present Illness:    Blayne is a 57 y.o. male w/h/o type II diabetes mellitus who presents with above chief complaint. Patient states that approximately 2 weeks ago he was helping a neighbor with the 50s on box up a stair as he was walking up the steroid got very short of breath and winded and put the box down. He developed chest pressure like sensation over the bilateral breast region lasted for approximate 5-10 minutes and went away after he stopped exerting himself. Since then he's had on and off chest pressure in the same region with the same severity in the same provocation factors. Never had chest pain like this before and also has associated shortness of breath but no associated nausea vomiting or diaphoresis. Patient states that he was on some form of insulin a few weeks ago presented himself off because he thought maybe was causing some of these recent symptoms he's been having. He is not sure what his blood sugars been either. Also claims that he's been having a decreased appetite in general has not been feeling well recently also and felt like he was almost in a pass out. Dies any new skin rashes.    Review of Systems:    Has Review of Systems   Constitutional: Positive for malaise/fatigue and weight loss. Negative for chills and fever.   HENT: Negative for hearing loss.    Eyes: Negative for blurred vision.   Respiratory: Positive for shortness of breath. Negative for cough, hemoptysis and sputum production.    Cardiovascular: Positive for chest pain. Negative for palpitations and leg swelling.   Gastrointestinal: Negative for abdominal pain, heartburn, nausea and vomiting.  "  Genitourinary: Negative for dysuria and urgency.   Musculoskeletal: Negative for myalgias and neck pain.   Skin: Negative for itching.   Neurological: Positive for weakness. Negative for dizziness, focal weakness and loss of consciousness.   Endo/Heme/Allergies: Does not bruise/bleed easily.   Psychiatric/Behavioral: Negative for depression.   All other systems reviewed and are negative.    Please see HPI, all other systems were reviewed and are negative (AMA/CMS criteria)              Past Medical/ Family / Social history (PFSH):   Past medical history  -Type I diabetes mellitus  -Hypertension    Surgical history  -Denies any prior history    Current Outpatient Medications:  No current facility-administered medications on file prior to encounter.      Current Outpatient Prescriptions on File Prior to Encounter   Medication Sig Dispense Refill   • HYDROcodone-acetaminophen (NORCO) 5-325 MG Tab per tablet Take 1-2 Tabs by mouth every four hours as needed.         Medication Allergy/Sensitivities:  No Known Allergies    Family History:  No family history of diabetes or stroke     Social History:  Social History   Substance Use Topics   • Smoking status: Never Smoker   • Smokeless tobacco: Never Used   • Alcohol use Not on file     #################################################################  Physical Exam:   Vitals/ General Appearance:   Weight/BMI: Body mass index is 37.7 kg/m².  Blood pressure 115/81, pulse (!) 101, temperature 36.7 °C (98 °F), resp. rate 20, height 1.905 m (6' 3\"), weight (!) 136.8 kg (301 lb 9.4 oz), SpO2 95 %.   Vitals:    03/15/18 1700 03/15/18 1715 03/15/18 1730 03/15/18 1740   BP:       Pulse: 89 100 98 (!) 101   Resp:    20   Temp:       SpO2: 96% 94% 95% 95%   Weight:       Height:        Oxygen Therapy:  Pulse Oximetry: 95 %, O2 (LPM): 0    Constitutional:  well developed, obese  HENMT: Normocephalic, atraumatic, b/l ears normal, nose normal  Eyes:  EOMI, conjunctiva normal, no " discharge  Neck: no tracheal deviation, supple  Cardiovascular: normal heart rate, normal rhythm, no murmurs, no rubs or gallops; no cyanosis, clubbing or edema  Lungs: Respiratory effort is normal, normal breath sounds, breath sounds clear to auscultation b/l, no rales, rhonchi or wheezing  Abdomen: soft, non-tender, no guarding or rebound  Skin: warm, dry, no erythema, no rash  Neurologic: Alert and oriented, strength 5 out of 5 throughout, no focal deficits, CN II-XII normal  Psychiatric: No anxiety or depression    #################################################################  Lab Data Review:    Objective   Recent Results (from the past 24 hour(s))   EKG (ER)    Collection Time: 03/15/18  9:54 AM   Result Value Ref Range    Report       [facility] Emergency Dept.    Test Date:  2018-03-15  Pt Name:    CAROLYN CRAIG                 Department: ER  MRN:        [id number]                      Room:  Gender:     M                            Technician: [id number]  :        1960                   Requested By:ER TRIAGE PROTOCOL  Order #:    [id number]                    Reading MD:    Measurements  Intervals                                Axis  Rate:       107                          P:          0  MO:         150                          QRS:        30  QRSD:       100                          T:          -73  QT:         305  QTc:        407    Interpretive Statements  SINUS TACHYCARDIA  EARLY PRECORDIAL R/S TRANSITION  NONSPECIFIC T ABNORMALITIES, INFERIOR LEADS  BASELINE WANDER IN LEAD(S) V1  No previous ECG available for comparison     CBC WITH DIFFERENTIAL    Collection Time: 03/15/18 10:28 AM   Result Value Ref Range    WBC 15.1 (H) 4.8 - 10.8 K/uL    RBC 5.72 4.70 - 6.10 M/uL    Hemoglobin 16.6 14.0 - 18.0 g/dL    Hematocrit 48.3 42.0 - 52.0 %    MCV 84.4 81.4 - 97.8 fL    MCH 29.0 27.0 - 33.0 pg    MCHC 34.4 33.7 - 35.3 g/dL    RDW 39.1 35.9 - 50.0 fL    Platelet Count 394 164 - 446  K/uL    MPV 10.4 9.0 - 12.9 fL    Neutrophils-Polys 68.20 44.00 - 72.00 %    Lymphocytes 24.60 22.00 - 41.00 %    Monocytes 5.80 0.00 - 13.40 %    Eosinophils 0.30 0.00 - 6.90 %    Basophils 0.50 0.00 - 1.80 %    Immature Granulocytes 0.60 0.00 - 0.90 %    Nucleated RBC 0.00 /100 WBC    Neutrophils (Absolute) 10.33 (H) 1.82 - 7.42 K/uL    Lymphs (Absolute) 3.72 1.00 - 4.80 K/uL    Monos (Absolute) 0.87 (H) 0.00 - 0.85 K/uL    Eos (Absolute) 0.05 0.00 - 0.51 K/uL    Baso (Absolute) 0.07 0.00 - 0.12 K/uL    Immature Granulocytes (abs) 0.09 0.00 - 0.11 K/uL    NRBC (Absolute) 0.00 K/uL   CMP    Collection Time: 03/15/18 10:28 AM   Result Value Ref Range    Sodium 128 (L) 135 - 145 mmol/L    Potassium 4.0 3.6 - 5.5 mmol/L    Chloride 99 96 - 112 mmol/L    Co2 9 (LL) 20 - 33 mmol/L    Anion Gap 20.0 (H) 0.0 - 11.9    Glucose 341 (H) 65 - 99 mg/dL    Bun 17 8 - 22 mg/dL    Creatinine 0.99 0.50 - 1.40 mg/dL    Calcium 10.2 8.5 - 10.5 mg/dL    AST(SGOT) 11 (L) 12 - 45 U/L    ALT(SGPT) 19 2 - 50 U/L    Alkaline Phosphatase 142 (H) 30 - 99 U/L    Total Bilirubin 0.5 0.1 - 1.5 mg/dL    Albumin 4.4 3.2 - 4.9 g/dL    Total Protein 7.2 6.0 - 8.2 g/dL    Globulin 2.8 1.9 - 3.5 g/dL    A-G Ratio 1.6 g/dL   TROPONIN    Collection Time: 03/15/18 10:28 AM   Result Value Ref Range    Troponin I <0.01 0.00 - 0.04 ng/mL   BNP    Collection Time: 03/15/18 10:28 AM   Result Value Ref Range    B Natriuretic Peptide <2 0 - 100 pg/mL   ESTIMATED GFR    Collection Time: 03/15/18 10:28 AM   Result Value Ref Range    GFR If African American >60 >60 mL/min/1.73 m 2    GFR If Non African American >60 >60 mL/min/1.73 m 2   BETA-HYDROXYBUTYRIC ACID    Collection Time: 03/15/18  2:50 PM   Result Value Ref Range    beta-Hydroxybutyric Acid 5.09 (H) 0.02 - 0.27 mmol/L   ACCU-CHEK GLUCOSE    Collection Time: 03/15/18  2:54 PM   Result Value Ref Range    Glucose - Accu-Ck 264 (H) 65 - 99 mg/dL   ACCU-CHEK GLUCOSE    Collection Time: 03/15/18  3:59 PM    Result Value Ref Range    Glucose - Accu-Ck 238 (H) 65 - 99 mg/dL   CBC with Differential    Collection Time: 03/15/18  5:01 PM   Result Value Ref Range    WBC 13.8 (H) 4.8 - 10.8 K/uL    RBC 5.29 4.70 - 6.10 M/uL    Hemoglobin 14.9 14.0 - 18.0 g/dL    Hematocrit 44.2 42.0 - 52.0 %    MCV 83.6 81.4 - 97.8 fL    MCH 28.2 27.0 - 33.0 pg    MCHC 33.7 33.7 - 35.3 g/dL    RDW 39.1 35.9 - 50.0 fL    Platelet Count 363 164 - 446 K/uL    MPV 10.0 9.0 - 12.9 fL    Neutrophils-Polys 59.00 44.00 - 72.00 %    Lymphocytes 30.60 22.00 - 41.00 %    Monocytes 8.50 0.00 - 13.40 %    Eosinophils 0.90 0.00 - 6.90 %    Basophils 0.50 0.00 - 1.80 %    Immature Granulocytes 0.50 0.00 - 0.90 %    Nucleated RBC 0.00 /100 WBC    Neutrophils (Absolute) 8.15 (H) 1.82 - 7.42 K/uL    Lymphs (Absolute) 4.23 1.00 - 4.80 K/uL    Monos (Absolute) 1.17 (H) 0.00 - 0.85 K/uL    Eos (Absolute) 0.12 0.00 - 0.51 K/uL    Baso (Absolute) 0.07 0.00 - 0.12 K/uL    Immature Granulocytes (abs) 0.07 0.00 - 0.11 K/uL    NRBC (Absolute) 0.00 K/uL   Comp Metabolic Panel with Phosphorus, Magnesium    Collection Time: 03/15/18  5:01 PM   Result Value Ref Range    Sodium 131 (L) 135 - 145 mmol/L    Potassium 3.7 3.6 - 5.5 mmol/L    Chloride 104 96 - 112 mmol/L    Co2 12 (L) 20 - 33 mmol/L    Anion Gap 15.0 (H) 0.0 - 11.9    Glucose 216 (H) 65 - 99 mg/dL    Bun 16 8 - 22 mg/dL    Creatinine 0.95 0.50 - 1.40 mg/dL    Calcium 9.8 8.5 - 10.5 mg/dL    AST(SGOT) 9 (L) 12 - 45 U/L    ALT(SGPT) 14 2 - 50 U/L    Alkaline Phosphatase 111 (H) 30 - 99 U/L    Total Bilirubin 0.5 0.1 - 1.5 mg/dL    Albumin 3.8 3.2 - 4.9 g/dL    Total Protein 6.5 6.0 - 8.2 g/dL    Globulin 2.7 1.9 - 3.5 g/dL    A-G Ratio 1.4 g/dL   Troponin - STAT Once    Collection Time: 03/15/18  5:01 PM   Result Value Ref Range    Troponin I <0.01 0.00 - 0.04 ng/mL   TSH (Thyroid Stimulating Hormone)    Collection Time: 03/15/18  5:01 PM   Result Value Ref Range    TSH 1.460 0.380 - 5.330 uIU/mL   ESTIMATED  GFR    Collection Time: 03/15/18  5:01 PM   Result Value Ref Range    GFR If African American >60 >60 mL/min/1.73 m 2    GFR If Non African American >60 >60 mL/min/1.73 m 2   ACCU-CHEK GLUCOSE    Collection Time: 03/15/18  5:09 PM   Result Value Ref Range    Glucose - Accu-Ck 214 (H) 65 - 99 mg/dL       (click the triangle to expand results)    My interpretation of lab results:     Imaging/Procedures Review:    CT-CTA CHEST PULMONARY ARTERY W/ RECONS   Final Result      No central or segmental pulmonary embolus or evidence of other acute thoracic pathology            DX-CHEST-PORTABLE (1 VIEW)   Final Result      No acute cardiopulmonary process is seen.      ECHOCARDIOGRAM-COMP W/ CONT    (Results Pending)       EKG:   per my independent read:  Sinus tachycardia, heart rate 107, no evidence of acute ST segment changes    Assessment and Plan:      * Diabetic ketoacidosis without coma associated with type 2 diabetes mellitus (CMS-HCC)- (present on admission)   Assessment & Plan    -Appears to not be well controlled, see below  -Initiate DKA protocol and insulin drip  -Chek every 2 hours BMPs  -Admit to ICU and monitor on telemetry closely  -Diabetes educator  -No clear evidence of infection or heart attack at this time        Essential hypertension- (present on admission)   Assessment & Plan    -Continue outpatient blood pressure medications and adjust as needed        Hyponatremia- (present on admission)   Assessment & Plan    -Likely pseudo-in nature given hyperglycemia  -Monitor closely as we fluid resuscitate him and check sodium level in the morning        Leukocytosis- (present on admission)   Assessment & Plan    -Is elevated this time with no clear evidence of infection, check a urinalysis as well as blood cultures and avoid antibiotics at this time  -Check white blood cell count tomorrow morning        Shortness of breath- (present on admission)   Assessment & Plan    -Possibly related to underlying CHF  versus other etiology  -No clear evidence of pneumonia and CT angiogram of the chest is negative for pulmonary embolus        Chest pain- (present on admission)   Assessment & Plan    -Certainly concerning for possible underlying ischemic heart disease given multiple risk factors  -EKG in the ER shows no evidence of acute ST segment changes and initial cardiac markers are negative ×1  -After his DKA is resolved patient will need a nuclear medicine cardiac stress test and we will monitor on telemetry while in the ICU  -Continue to trend cardiac markers and perform serial EKGs and Nitropaste as needed for any chest pain  -Check A1c and lipid panel  -Continue full dose aspirin 324 mg daily              1. Prophylaxis: sc heparin  2. Code: Full code per patient with daughter present  3. Dispo: He will be admitted to inpatient for management that is expected to take greater than 2 midnights    34 minutes of critical care time was spent on this patient excluding any procedures or overlap    This dictation was created using voice recognition software. The accuracy of the dictation is limited to the abilities of the software. I expect there may be some errors of grammar and possibly content.

## 2018-03-16 NOTE — PROGRESS NOTES
Pt brought up to ICU.  Gtts verified and blood sugar checked.  Mg and Phos labs pending.  VSS and pt resting comfortably in bed.  Chart check completed with night shift RN.

## 2018-03-16 NOTE — DIETARY
"Nutrition services: Day 1 of admit.  Dakota Cisneros is a 57 y.o. male with admitting DX of DKA (diabetic ketoacidoses), (CMS-HCC), Chest pain w/ knpwn past medical hx significant for DM-type I, and HTN per H&P.   Consult received for inadequate PO intake and unintentional wt loss prior to admit. Met w/ pt this date to discuss current intake and wt status. Pt reports that he has experienced a ~20# wt loss in the past two weeks (6.3% - severe). Pt states that is intake has significantly decreased 2' poor appetite and ongoing c/o GI distress. Therefore, he stopped his DM medication and ended up admitted for DKA.     Assessment:  Height: 190.5 cm (6' 3\")  Weight: (!) 134.2 kg (295 lb 13.7 oz)  Body mass index is 36.98 kg/m².   Reported UBW: 143.2kg (315#) Recent wt change: 20# (6.3%) severe reported unintentional wt loss x 2 weeks.     Diet/Intake: NPO/reported poor PO for the past two weeks per pt report     Evaluation:   1. Pt reports a severe 20# (6.3%) unintentional wt loss in the past two weeks 2' ongoing GI distress and significant decrease in intake.   2. Prior to onset of symptoms, pt eating fine and reports has been experiencing \"wt gain\" d/t his diabetic medications.   3. Pt currently NPO at this time 2' presents w/ DKA, previously on D5 and D10 infusion, off at this time, pt receiving IV insulin @6units/hr.   4. Pt reports that he is appetite and feelings of hunger are gradually returning and would like to eat a meal at this time.   5. Diabetic diet consult received, offered education; however, pt reports that he has been educated on appropriate dietary intake for diabetes management in the past. No further questions or concerns at this time.     Malnutrition Risk: pt at risk for malnutrition r/t inadequate PO intake x 2 weeks as evidence by 20# (6.3%) reported severe unintentional wt loss x 2 weeks.     Recommendations/Plan:  1. As soon as medically feasible ADAT to diabetic diet.    2. Encourage intake of " all meals once diet advances  3. Document intake of all meals as % taken in ADL's to provide interdisciplinary communication across all shifts.   4. Monitor weight.  5. Nutrition rep will continue to see patient for ongoing meal and snack preferences.     RD to monitor for diet advancement vs need for nutrition intervention.

## 2018-03-16 NOTE — ASSESSMENT & PLAN NOTE
Stress test negative, troponins are negative, EKG with no significant ST changes  Story is concerning, pain worse with activity  CTA chest negative for PE or thoracic abnormality  Echo difficult study but no gross valve abnormality

## 2018-03-16 NOTE — ASSESSMENT & PLAN NOTE
CT chest with no fluid overload, pneumonia or pulmonary cause him  Dyspnea on minor exertion  Negative stress test  Possible deconditioning  Monitor on telemetry  Normal TSH

## 2018-03-16 NOTE — CARE PLAN
Problem: Knowledge Deficit  Goal: Knowledge of disease process/condition, treatment plan, diagnostic tests, and medications will improve    Intervention: Assess knowledge level of disease process/condition, treatment plan, diagnostic tests, and medications  Patient educated regarding treatment plan, DKA, medications, lab testing. Questions regarding plan of care at this time answered.       Problem: Pain Management  Goal: Pain level will decrease to patient's comfort goal    Intervention: Follow pain managment plan developed in collaboration with patient and Interdisciplinary Team  Pain assessed every 2 hours using 0-10 pain scale, scheduled oxycontin given.

## 2018-03-16 NOTE — PROGRESS NOTES
"Pulmonary Critical Care Progress Note        Chief Complaint: DKA    History of Present Illness: \" The patient is a 57-year-old male with past medical history significant for obesity, hypertension and type 2 diabetes, who presented to urgent care today complaining of 2 weeks of intermittent chest pain, described as pressure like with associated shortness of breath and nausea.  He also was noted to have intermittent productive cough of white sputum, poor appetite and unintentional 20-pound weight loss.  He had recently been treated with a Z-Sameer for bronchitis.  Given his symptoms, he was sent from the urgent care to [facility] Emergency Department where he was found to be in diabetic ketoacidosis.  His EKG was nonspecific and his troponins have been negative x3.  He is being admitted to the intensive care unit for DKA management and I was consulted for assistance in his critical care management.\"       Review of Systems   Constitutional: Positive for malaise/fatigue. Negative for chills and fever.   Respiratory: Positive for shortness of breath. Negative for cough, sputum production and stridor.    Cardiovascular: Positive for chest pain.   Gastrointestinal: Negative for abdominal pain, nausea and vomiting.   Genitourinary: Negative for dysuria.   Musculoskeletal: Negative for myalgias.   Skin: Negative for rash.   Neurological: Negative for dizziness, sensory change and focal weakness.       Interval Events:  24 hour interval history reviewed   - Admitted overnight   - Neuro: AOx4   - HR: 90s-100s sinus   - BP: 100s-150s systolic   - GI: NPO   - UOP: 1L   - Hightower: no   - Tm: afeb   - Lines: PIV   - PPx: GI not indicated, DVT PPI   - AG closed, CO2 17    PFSH:  No change.    Physical Exam   Constitutional: He is oriented to person, place, and time. He appears unhealthy.   HENT:   Head: Normocephalic and atraumatic.   Right Ear: External ear normal.   Left Ear: External ear normal.   Nose: Nose normal.   Mouth/Throat: " Oropharynx is clear and moist.   Eyes: Conjunctivae and EOM are normal. Pupils are equal, round, and reactive to light. Right eye exhibits no discharge. Left eye exhibits no discharge.   Neck: Neck supple. No JVD present. No tracheal deviation present.   Cardiovascular: Normal rate, regular rhythm and normal heart sounds.    No murmur heard.  Pulmonary/Chest: Effort normal and breath sounds normal. No respiratory distress. He has no wheezes.   Abdominal: Soft. Bowel sounds are normal. He exhibits no distension. There is no tenderness.   Musculoskeletal: Normal range of motion. He exhibits no edema.   Neurological: He is alert and oriented to person, place, and time. No cranial nerve deficit. Coordination normal. GCS score is 15.   Skin: Skin is warm and dry. No rash noted.   Psychiatric: Affect normal.   Nursing note and vitals reviewed.      Respiratory:     Pulse Oximetry: 96 %  Chest Tube Drains:  ImagingAvailable data reviewed         Invalid input(s): [id number]    HemoDynamics:  Pulse: 95, Heart Rate (Monitored): 95  Blood Pressure: 115/81, NIBP: 104/63     Imaging: Available data reviewed  Recent Labs      03/15/18   1028  03/15/18   1701  03/15/18   2150   TROPONINI  <0.01  <0.01  <0.01   BNPBTYPENAT  <2   --    --        Neuro:  GCS Total Matt Coma Score: 15  Imaging: Available data reviewed    Fluids:  Intake/Output       03/14/18 0700 - 03/15/18 0659 (Not Admitted) 03/15/18 0700 - 03/16/18 0659 03/16/18 0700 - 03/17/18 0659      [id number] 4374-6644 Total 4930-1318 8680-2788 Total 8456-7341 9695-4747 Total       Intake    I.V.  --  -- --  --  1453.1 1453.1  --  -- --    Magnesium Sulfate Volume -- -- -- -- 50 50 -- -- --    Insulin Volume -- -- -- -- 315.6 315.6 -- -- --    IV Volume (D5 1/2 NS) -- -- -- -- 262.5 262.5 -- -- --    IV Volume (D10 1/2 NS) -- -- -- -- 325 325 -- -- --    IV Volume (Potassium phosphate) -- -- -- -- 500 500 -- -- --    Total Intake -- -- -- -- 1453.1 1453.1 -- -- --        Output    Urine  --  -- --  --  1000 1000  --  -- --    Void (ml) -- -- -- -- 1000 1000 -- -- --    Stool  --  -- --  --  -- --  --  -- --    Number of Times Stooled -- -- -- -- 0 x 0 x -- -- --    Total Output -- -- -- -- 1000 1000 -- -- --       Net I/O     -- -- -- -- 453.1 453.1 -- -- --        Weight: (!) 134.2 kg (295 lb 13.7 oz)  Recent Labs      03/15/18   1659   03/15/18   2150  03/16/18   0146  03/16/18   0558   SODIUM   --    < >  133*  132*  133*   POTASSIUM   --    < >  3.6  3.7  3.4*   CHLORIDE   --    < >  106  105  106   CO2   --    < >  14*  16*  17*   BUN   --    < >  16  15  16   CREATININE   --    < >  0.91  0.88  0.85   MAGNESIUM  1.6   --    --   2.0   --    PHOSPHORUS  1.7*   --    --    --    --    CALCIUM   --    < >  9.5  9.2  9.4    < > = values in this interval not displayed.       GI/Nutrition:  Imaging: Available data reviewed  NPO  Liver Function  Recent Labs      03/15/18   1028  03/15/18   1701  03/15/18   2150  03/16/18   0146  03/16/18   0558   ALTSGPT  19  14   --    --    --    ASTSGOT  11*  9*   --    --    --    ALKPHOSPHAT  142*  111*   --    --    --    TBILIRUBIN  0.5  0.5   --    --    --    GLUCOSE  341*  216*  123*  142*  136*       Heme:  Recent Labs      03/15/18   1028  03/15/18   1701  03/16/18   0558   RBC  5.72  5.29  5.01   HEMOGLOBIN  16.6  14.9  14.6   HEMATOCRIT  48.3  44.2  42.1   PLATELETCT  394  363  329       Infectious Disease:  Temp  Av.1 °C (98.8 °F)  Min: 36.7 °C (98 °F)  Max: 37.4 °C (99.4 °F)  Micro: cultures reviewed  Recent Labs      03/15/18   1028  03/15/18   1701  18   0558   WBC  15.1*  13.8*  10.8   NEUTSPOLYS  68.20  59.00  61.40   LYMPHOCYTES  24.60  30.60  26.60   MONOCYTES  5.80  8.50  9.00   EOSINOPHILS  0.30  0.90  1.90   BASOPHILS  0.50  0.50  0.60   ASTSGOT  11*  9*   --    ALTSGPT  19  14   --    ALKPHOSPHAT  142*  111*   --    TBILIRUBIN  0.5  0.5   --      Current Facility-Administered Medications   Medication Dose  Frequency Provider Last Rate Last Dose   • potassium chloride 10 mEq in  mL IVPB  10 mEq Once Neil Arceo Jr., D.O.       • omeprazole (PRILOSEC) capsule 20 mg  20 mg QAM AC Randell Avila M.D.   20 mg at 03/16/18 0606   • lisinopril (PRINIVIL) 10 MG tablet 10 mg  10 mg Q EVENING Randell Avila M.D.   10 mg at 03/15/18 2216   • oxyCODONE CR (OXYCONTIN) tablet 40 mg  40 mg Q8HRS Randell Avila M.D.   40 mg at 03/16/18 0606   • dextrose 10% and 0.45% NaCl infusion   Continuous Randell Avila M.D. 50 mL/hr at 03/15/18 2034     • aspirin (ASA) tablet 325 mg  325 mg DAILY Randell Avila M.D.   325 mg at 03/15/18 1625    Or   • aspirin (ASA) chewable tab 324 mg  324 mg DAILY Randell Avila M.D.        Or   • aspirin (ASA) suppository 300 mg  300 mg DAILY Randell Avila M.D.       • MD ALERT-PHARMACY TO CONSULT FOR DKA MONITORING 1 Each  1 Each PRCHERI Avila M.D.       • Adult DKA potassium(K+) replacement scale  1 Each Q4HRS Randell Avila M.D.   1 Each at 03/16/18 0600   • senna-docusate (PERICOLACE or SENOKOT S) 8.6-50 MG per tablet 2 Tab  2 Tab BID Randell Avila M.D.        And   • polyethylene glycol/lytes (MIRALAX) PACKET 1 Packet  1 Packet QDAY PRN Randell Avila M.D.        And   • magnesium hydroxide (MILK OF MAGNESIA) suspension 30 mL  30 mL QDAY PRCHERI Avila M.D.        And   • bisacodyl (DULCOLAX) suppository 10 mg  10 mg QDAY PRCHERI Avila M.D.       • Respiratory Care per Protocol   Continuous RT Randell Avila M.D.       • 1/2 NS infusion   Continuous Randell Avila M.D.   Stopped at 03/15/18 1836   • D5 1/2 NS infusion   Continuous Randell Avila M.D.   Stopped at 03/15/18 2033   • heparin injection 5,000 Units  5,000 Units Q8HRS Randell Avila M.D.   5,000 Units at 03/16/18 0606   • acetaminophen (TYLENOL) tablet 650 mg  650 mg Q6HRS PRCHERI Avila M.D.       • glucose 4 g chewable tablet 16 g  16 g Q15 MIN PRN Randell MELGAR  MARIA A Avila        And   • dextrose 50% (D50W) injection 25 mL  25 mL Q15 MIN PRN Randell Avila M.D.       • ondansetron (ZOFRAN) syringe/vial injection 4 mg  4 mg Q4HRS PRCHERI Avila M.D.       • ondansetron (ZOFRAN ODT) dispertab 4 mg  4 mg Q4HRS PRN Randell Avila M.D.       • promethazine (PHENERGAN) tablet 12.5-25 mg  12.5-25 mg Q4HRS PRCHERI Avila M.D.       • promethazine (PHENERGAN) suppository 12.5-25 mg  12.5-25 mg Q4HRS PRCHERI Avila M.D.       • prochlorperazine (COMPAZINE) injection 5-10 mg  5-10 mg Q4HRS PRCHERI Avila M.D.       • insulin regular human (HUMULIN/NOVOLIN R) 62.5 Units in  mL Infusion for DKA  6 Units/hr Continuous Randell Avila M.D. 24 mL/hr at 03/16/18 0657 6 Units/hr at 03/16/18 0657     Last reviewed on 3/15/2018 11:52 AM by Shira Ruelas    Quality  Measures:  Labs reviewed, Radiology images reviewed and Medications reviewed  Hightower catheter: No Hightower      DVT Prophylaxis: Heparin            Problems/Plan:  Acute diabetic ketoacidosis   - likely secondary to either recent viral bronchitis vs medication changes   - intravascular volume optimized with IVF bolus   - continue DKA protocol with insulin drip   - Every hour Accu-Cheks, every 4 hour BMP, mag, phos   - Goal Magnesium: 2, Phosphorus: 2, K 5.5   - Will transition to basal and sliding scale insulin when anion gap <12, CO2 > 17    Obesity   - RD consult   - Counseling    Systemic arterial hypertension   - Continue lisinopril    Chronic pain   - Continue home analgesia    Chest pain   - Troponin negative ×3   - EKG nonspecific   - CTPA negative for PE   - Echo, NM Stress test    Intravascular volume depletion   - Resolved with IV fluids    Hypophosphatemia   - Improving with replacement    Hypomagnesemia   -  Improving with replacement    Hyponatremia   - Improving with IV fluids     This patient is critically ill, at high risk for decompensation leading to worsening  vital organ dysfunction and death without critical care interventions.    Discussed patient condition and risk of morbidity and/or mortality with RN, RT, Pharmacy, Dietary, , Charge nurse / hot rounds and hospitalist.    The patient remains critically ill.  Critical care time = 35 minutes in directly providing and coordinating critical care and extensive data review.  No time overlap and excludes procedures.

## 2018-03-16 NOTE — ED NOTES
Patient stable.  ICU RN at bedside to get patient.  Patient verbalizes understanding of admission.

## 2018-03-16 NOTE — PROGRESS NOTES
[facility] Progress Note    Date of Service: 3/16/2018    Chief Complaint  57 y.o. male admitted 3/15/2018 with chest pain and shortness of breath.    Interval Problem Update  Upon arrival patient wasn't noted to be in diabetic ketoacidosis.  Patient placed in the ICU and started on insulin drip.  Was off his diabetes meds per endocrinology.  Patient seen and examined in the ICU, ICU care given.  Discussed patient condition and plan with Intensivist, RN, RT and charge nurse / hot rounds.    Insulin 6  No overnight events  Alert and oriented  NSR  -120  NPO  Last bm prior to admission  1L uop   d10 1/2 ns at 150  Afebrile  RA    Consultants/Specialty  Intensivist    Disposition  Patient requires additional treatment in the hospital, he will likely be able to go home        Review of Systems   Constitutional: Negative for chills, fever and malaise/fatigue.   HENT: Negative for congestion.    Respiratory: Positive for shortness of breath. Negative for cough, sputum production and stridor.    Cardiovascular: Positive for chest pain. Negative for palpitations and leg swelling.   Gastrointestinal: Positive for heartburn. Negative for abdominal pain, constipation, diarrhea, nausea and vomiting.   Genitourinary: Negative for dysuria and urgency.   Musculoskeletal: Negative for falls and myalgias.   Neurological: Negative for dizziness, tingling, loss of consciousness, weakness and headaches.   Psychiatric/Behavioral: Negative for depression and suicidal ideas.   All other systems reviewed and are negative.     Physical Exam  Laboratory/Imaging   Hemodynamics  Temp (24hrs), Av.1 °C (98.8 °F), Min:36.7 °C (98 °F), Max:37.4 °C (99.4 °F)   Temperature: 37.2 °C (98.9 °F)  Pulse  Av.2  Min: 89  Max: 115 Heart Rate (Monitored): 95  Blood Pressure: 115/81, NIBP: 104/63      Respiratory      Respiration: 12, Pulse Oximetry: 96 %, O2 Daily Delivery Respiratory : Room Air with O2 Available        RUL Breath  Sounds: Clear;Diminished, RML Breath Sounds: Clear;Diminished, RLL Breath Sounds: Clear;Diminished, ISAIAS Breath Sounds: Clear;Diminished, LLL Breath Sounds: Clear;Diminished    Fluids    Intake/Output Summary (Last 24 hours) at 03/16/18 0801  Last data filed at 03/16/18 0400   Gross per 24 hour   Intake           1453.1 ml   Output             1000 ml   Net            453.1 ml       Nutrition  Orders Placed This Encounter   Procedures   • Diet NPO     Standing Status:   Standing     Number of Occurrences:   1     Order Specific Question:   Restrict to:     Answer:   Sips with Medications [3]     Physical Exam   Constitutional: He is oriented to person, place, and time. He appears well-developed.  Non-toxic appearance. No distress.   HENT:   Head: Normocephalic and atraumatic.   Mouth/Throat: Oropharynx is clear and moist. No oropharyngeal exudate.   Eyes: Right eye exhibits no discharge. Left eye exhibits no discharge.   Neck: Neck supple. No tracheal deviation, no edema and no erythema present.   Cardiovascular: Normal rate and regular rhythm.  Exam reveals no gallop and no friction rub.    No murmur heard.  Pulmonary/Chest: Effort normal and breath sounds normal. No stridor. No respiratory distress. He has no wheezes. He has no rales. He exhibits no tenderness.   Abdominal: Soft. Bowel sounds are normal. He exhibits no distension. There is no tenderness.   Musculoskeletal: Normal range of motion. He exhibits no edema or tenderness.   Lymphadenopathy:     He has no cervical adenopathy.   Neurological: He is alert and oriented to person, place, and time. No cranial nerve deficit.   Skin: Skin is warm and dry. No rash noted. He is not diaphoretic. No erythema.   Psychiatric: He has a normal mood and affect. His behavior is normal. Judgment and thought content normal.   Nursing note and vitals reviewed.      Recent Labs      03/15/18   1028  03/15/18   1701  03/16/18   0558   WBC  15.1*  13.8*  10.8   RBC  5.72  5.29   5.01   HEMOGLOBIN  16.6  14.9  14.6   HEMATOCRIT  48.3  44.2  42.1   MCV  84.4  83.6  84.0   MCH  29.0  28.2  29.1   MCHC  34.4  33.7  34.7   RDW  39.1  39.1  39.9   PLATELETCT  394  363  329   MPV  10.4  10.0  10.2     Recent Labs      03/15/18   2150  03/16/18   0146  03/16/18   0558   SODIUM  133*  132*  133*   POTASSIUM  3.6  3.7  3.4*   CHLORIDE  106  105  106   CO2  14*  16*  17*   GLUCOSE  123*  142*  136*   BUN  16  15  16   CREATININE  0.91  0.88  0.85   CALCIUM  9.5  9.2  9.4         Recent Labs      03/15/18   1028   BNPBTYPENAT  <2     Recent Labs      03/16/18   0146   TRIGLYCERIDE  172*   HDL  23*   LDL  74          Assessment/Plan     * Diabetic ketoacidosis without coma associated with type 2 diabetes mellitus (CMS-[facility])- (present on admission)   Assessment & Plan    - Continue insulin drip  - When appropriate, transitioned to Lantus, insulin sliding scale and start diet  - Closely monitor electrolytes  - Due to the fact the patient was not taking any insulin, will need to go home with insulin, await diabetic education        Essential hypertension- (present on admission)   Assessment & Plan    - Controlled on lisinopril        Hyponatremia- (present on admission)   Assessment & Plan    - Pseudo-, low due to glucose level, now markedly improved        Leukocytosis- (present on admission)   Assessment & Plan    - Resolved, no antibiotics needed at this time        Shortness of breath- (present on admission)   Assessment & Plan    - CT chest with no fluid overload, pneumonia or pulmonary cause him  - Await stress test        Chest pain- (present on admission)   Assessment & Plan    - Stress test has been ordered for the morning, troponins are negative, EKG with no significant ST changes  - Story is concerning, pain worse with activity          Quality-Core Measures   Reviewed items::  Labs reviewed, Medications reviewed and Radiology images reviewed  DVT prophylaxis pharmacological::  Heparin  Ulcer  Prophylaxis::  Yes

## 2018-03-16 NOTE — PROGRESS NOTES
Dr. Gonda paged regarding serial EKGs and troponins. Dr. Gonda states serial EKGs and troponins can be discontinued at this time.

## 2018-03-16 NOTE — ASSESSMENT & PLAN NOTE
Controlled  Diabetic education required and discussed with patient and RN.  Await further RN education   Increased on his basal NPH.  Continue SSI and Accucheck  HgbA1c:13.3

## 2018-03-17 ENCOUNTER — APPOINTMENT (OUTPATIENT)
Dept: RADIOLOGY | Facility: MEDICAL CENTER | Age: 58
DRG: 638 | End: 2018-03-17
Attending: INTERNAL MEDICINE
Payer: MEDICARE

## 2018-03-17 LAB
ANION GAP SERPL CALC-SCNC: 11 MMOL/L (ref 0–11.9)
BASOPHILS # BLD AUTO: 0.8 % (ref 0–1.8)
BASOPHILS # BLD: 0.07 K/UL (ref 0–0.12)
BUN SERPL-MCNC: 13 MG/DL (ref 8–22)
CALCIUM SERPL-MCNC: 9.7 MG/DL (ref 8.5–10.5)
CHLORIDE SERPL-SCNC: 104 MMOL/L (ref 96–112)
CO2 SERPL-SCNC: 17 MMOL/L (ref 20–33)
CREAT SERPL-MCNC: 0.75 MG/DL (ref 0.5–1.4)
EOSINOPHIL # BLD AUTO: 0.15 K/UL (ref 0–0.51)
EOSINOPHIL NFR BLD: 1.7 % (ref 0–6.9)
ERYTHROCYTE [DISTWIDTH] IN BLOOD BY AUTOMATED COUNT: 41.1 FL (ref 35.9–50)
GLUCOSE BLD-MCNC: 240 MG/DL (ref 65–99)
GLUCOSE BLD-MCNC: 241 MG/DL (ref 65–99)
GLUCOSE BLD-MCNC: 323 MG/DL (ref 65–99)
GLUCOSE BLD-MCNC: 342 MG/DL (ref 65–99)
GLUCOSE SERPL-MCNC: 260 MG/DL (ref 65–99)
HCT VFR BLD AUTO: 42.1 % (ref 42–52)
HGB BLD-MCNC: 14.4 G/DL (ref 14–18)
IMM GRANULOCYTES # BLD AUTO: 0.04 K/UL (ref 0–0.11)
IMM GRANULOCYTES NFR BLD AUTO: 0.5 % (ref 0–0.9)
LYMPHOCYTES # BLD AUTO: 3.17 K/UL (ref 1–4.8)
LYMPHOCYTES NFR BLD: 36.7 % (ref 22–41)
MAGNESIUM SERPL-MCNC: 1.6 MG/DL (ref 1.5–2.5)
MCH RBC QN AUTO: 28.7 PG (ref 27–33)
MCHC RBC AUTO-ENTMCNC: 34.2 G/DL (ref 33.7–35.3)
MCV RBC AUTO: 83.9 FL (ref 81.4–97.8)
MONOCYTES # BLD AUTO: 0.84 K/UL (ref 0–0.85)
MONOCYTES NFR BLD AUTO: 9.7 % (ref 0–13.4)
NEUTROPHILS # BLD AUTO: 4.36 K/UL (ref 1.82–7.42)
NEUTROPHILS NFR BLD: 50.6 % (ref 44–72)
NRBC # BLD AUTO: 0 K/UL
NRBC BLD-RTO: 0 /100 WBC
PHOSPHATE SERPL-MCNC: 2.3 MG/DL (ref 2.5–4.5)
PLATELET # BLD AUTO: 325 K/UL (ref 164–446)
PMV BLD AUTO: 10.5 FL (ref 9–12.9)
POTASSIUM SERPL-SCNC: 3.7 MMOL/L (ref 3.6–5.5)
RBC # BLD AUTO: 5.02 M/UL (ref 4.7–6.1)
SODIUM SERPL-SCNC: 132 MMOL/L (ref 135–145)
WBC # BLD AUTO: 8.6 K/UL (ref 4.8–10.8)

## 2018-03-17 PROCEDURE — 83735 ASSAY OF MAGNESIUM: CPT

## 2018-03-17 PROCEDURE — 84100 ASSAY OF PHOSPHORUS: CPT

## 2018-03-17 PROCEDURE — 700111 HCHG RX REV CODE 636 W/ 250 OVERRIDE (IP): Performed by: INTERNAL MEDICINE

## 2018-03-17 PROCEDURE — 93306 TTE W/DOPPLER COMPLETE: CPT

## 2018-03-17 PROCEDURE — A9270 NON-COVERED ITEM OR SERVICE: HCPCS | Performed by: INTERNAL MEDICINE

## 2018-03-17 PROCEDURE — 82962 GLUCOSE BLOOD TEST: CPT

## 2018-03-17 PROCEDURE — A9502 TC99M TETROFOSMIN: HCPCS

## 2018-03-17 PROCEDURE — 700102 HCHG RX REV CODE 250 W/ 637 OVERRIDE(OP): Performed by: INTERNAL MEDICINE

## 2018-03-17 PROCEDURE — 770020 HCHG ROOM/CARE - TELE (206)

## 2018-03-17 PROCEDURE — 80048 BASIC METABOLIC PNL TOTAL CA: CPT

## 2018-03-17 PROCEDURE — 85025 COMPLETE CBC W/AUTO DIFF WBC: CPT

## 2018-03-17 PROCEDURE — 99232 SBSQ HOSP IP/OBS MODERATE 35: CPT | Performed by: HOSPITALIST

## 2018-03-17 PROCEDURE — 700111 HCHG RX REV CODE 636 W/ 250 OVERRIDE (IP): Performed by: HOSPITALIST

## 2018-03-17 PROCEDURE — 93306 TTE W/DOPPLER COMPLETE: CPT | Mod: 26 | Performed by: INTERNAL MEDICINE

## 2018-03-17 RX ORDER — MAGNESIUM SULFATE HEPTAHYDRATE 40 MG/ML
4 INJECTION, SOLUTION INTRAVENOUS ONCE
Status: COMPLETED | OUTPATIENT
Start: 2018-03-17 | End: 2018-03-17

## 2018-03-17 RX ORDER — DEXTROSE MONOHYDRATE 25 G/50ML
25 INJECTION, SOLUTION INTRAVENOUS
Status: DISCONTINUED | OUTPATIENT
Start: 2018-03-17 | End: 2018-03-19 | Stop reason: HOSPADM

## 2018-03-17 RX ORDER — POTASSIUM CHLORIDE 20 MEQ/1
20 TABLET, EXTENDED RELEASE ORAL ONCE
Status: COMPLETED | OUTPATIENT
Start: 2018-03-17 | End: 2018-03-17

## 2018-03-17 RX ORDER — REGADENOSON 0.08 MG/ML
INJECTION, SOLUTION INTRAVENOUS
Status: DISPENSED
Start: 2018-03-17 | End: 2018-03-18

## 2018-03-17 RX ADMIN — DIBASIC SODIUM PHOSPHATE, MONOBASIC POTASSIUM PHOSPHATE AND MONOBASIC SODIUM PHOSPHATE 1 TABLET: 852; 155; 130 TABLET ORAL at 17:00

## 2018-03-17 RX ADMIN — ASPIRIN 325 MG: 325 TABLET ORAL at 07:38

## 2018-03-17 RX ADMIN — MAGNESIUM SULFATE IN WATER 4 G: 40 INJECTION, SOLUTION INTRAVENOUS at 10:15

## 2018-03-17 RX ADMIN — INSULIN HUMAN 4 UNITS: 100 INJECTION, SOLUTION PARENTERAL at 21:47

## 2018-03-17 RX ADMIN — OXYCODONE HYDROCHLORIDE 40 MG: 40 TABLET, FILM COATED, EXTENDED RELEASE ORAL at 21:42

## 2018-03-17 RX ADMIN — INSULIN HUMAN 3 UNITS: 100 INJECTION, SOLUTION PARENTERAL at 07:29

## 2018-03-17 RX ADMIN — HEPARIN SODIUM 5000 UNITS: 5000 INJECTION, SOLUTION INTRAVENOUS; SUBCUTANEOUS at 06:01

## 2018-03-17 RX ADMIN — POTASSIUM CHLORIDE 20 MEQ: 1500 TABLET, EXTENDED RELEASE ORAL at 15:18

## 2018-03-17 RX ADMIN — DIBASIC SODIUM PHOSPHATE, MONOBASIC POTASSIUM PHOSPHATE AND MONOBASIC SODIUM PHOSPHATE 1 TABLET: 852; 155; 130 TABLET ORAL at 15:17

## 2018-03-17 RX ADMIN — DIBASIC SODIUM PHOSPHATE, MONOBASIC POTASSIUM PHOSPHATE AND MONOBASIC SODIUM PHOSPHATE 1 TABLET: 852; 155; 130 TABLET ORAL at 22:38

## 2018-03-17 RX ADMIN — OMEPRAZOLE 20 MG: 20 CAPSULE, DELAYED RELEASE ORAL at 06:01

## 2018-03-17 RX ADMIN — OXYCODONE HYDROCHLORIDE 40 MG: 40 TABLET, FILM COATED, EXTENDED RELEASE ORAL at 15:17

## 2018-03-17 RX ADMIN — HEPARIN SODIUM 5000 UNITS: 5000 INJECTION, SOLUTION INTRAVENOUS; SUBCUTANEOUS at 15:17

## 2018-03-17 RX ADMIN — LISINOPRIL 10 MG: 5 TABLET ORAL at 21:41

## 2018-03-17 RX ADMIN — INSULIN HUMAN 10 UNITS: 100 INJECTION, SOLUTION PARENTERAL at 16:53

## 2018-03-17 RX ADMIN — OXYCODONE HYDROCHLORIDE 40 MG: 40 TABLET, FILM COATED, EXTENDED RELEASE ORAL at 06:01

## 2018-03-17 RX ADMIN — HEPARIN SODIUM 5000 UNITS: 5000 INJECTION, SOLUTION INTRAVENOUS; SUBCUTANEOUS at 21:41

## 2018-03-17 RX ADMIN — INSULIN HUMAN 10 UNITS: 100 INJECTION, SUSPENSION SUBCUTANEOUS at 07:29

## 2018-03-17 RX ADMIN — HYDROCODONE BITARTRATE AND ACETAMINOPHEN 2 TABLET: 5; 325 TABLET ORAL at 07:38

## 2018-03-17 RX ADMIN — INSULIN HUMAN 10 UNITS: 100 INJECTION, SOLUTION PARENTERAL at 12:37

## 2018-03-17 ASSESSMENT — ENCOUNTER SYMPTOMS
FEVER: 0
FOCAL WEAKNESS: 0
ABDOMINAL PAIN: 0
DIZZINESS: 0
NAUSEA: 0
VOMITING: 0
COUGH: 0
STRIDOR: 0
SHORTNESS OF BREATH: 1
CHILLS: 0
MYALGIAS: 0
SPUTUM PRODUCTION: 0
SENSORY CHANGE: 0

## 2018-03-17 ASSESSMENT — COGNITIVE AND FUNCTIONAL STATUS - GENERAL
SUGGESTED CMS G CODE MODIFIER MOBILITY: CH
DAILY ACTIVITIY SCORE: 24
SUGGESTED CMS G CODE MODIFIER DAILY ACTIVITY: CH
MOBILITY SCORE: 24

## 2018-03-17 ASSESSMENT — PAIN SCALES - GENERAL
PAINLEVEL_OUTOF10: 3
PAINLEVEL_OUTOF10: 3
PAINLEVEL_OUTOF10: 5
PAINLEVEL_OUTOF10: 3
PAINLEVEL_OUTOF10: 3
PAINLEVEL_OUTOF10: 5
PAINLEVEL_OUTOF10: 3
PAINLEVEL_OUTOF10: 7
PAINLEVEL_OUTOF10: 7
PAINLEVEL_OUTOF10: 3

## 2018-03-17 ASSESSMENT — LIFESTYLE VARIABLES: DO YOU DRINK ALCOHOL: NO

## 2018-03-17 NOTE — CARE PLAN
Problem: Bowel/Gastric:  Goal: Normal bowel function is maintained or improved    Intervention: Educate patient and significant other/support system about diet, fluid intake, medications and activity to promote bowel function  Discussed diabetic diet with patient, encouraged intake of fluids to promote bowel function.       Problem: Knowledge Deficit  Goal: Knowledge of disease process/condition, treatment plan, diagnostic tests, and medications will improve    Intervention: Assess knowledge level of disease process/condition, treatment plan, diagnostic tests, and medications  Discussed treatment plan with patient, educated patient regarding stress test tomorrow and NPO status.

## 2018-03-17 NOTE — PROGRESS NOTES
[facility] Hospitalist Progress Note    Date of Service: 3/17/18  Signed 3/18/2018    Chief Complaint  57 y.o. male admitted 3/15/2018 with DKA from recent cessation of his diabetic home medication. He had c/o chest pain as well.    Interval Problem Update  Stress test negative.  In NSR.  Diabetic diet.  HgbA1c:13.  Initiated on insulin and SSI. He needs further diabetic education.  He states he has an endocrine appt on 18.  Daughter at his bedside.  States a 70lb weight gain after initiation of diabetic med, tresiba.    Consultants/Specialty  none    Disposition  Home after diabetic education.        Review of Systems   Constitutional: Negative for fever and weight loss.   HENT: Negative for congestion.    Eyes: Negative for blurred vision and discharge.   Respiratory: Positive for shortness of breath (on exertion). Negative for cough and stridor.    Cardiovascular: Positive for orthopnea. Negative for chest pain, palpitations and leg swelling.   Gastrointestinal: Negative for abdominal pain, blood in stool, diarrhea, melena and nausea.   Genitourinary: Negative for dysuria and hematuria.   Musculoskeletal: Negative for back pain and joint pain.   Skin: Negative for rash.   Neurological: Negative for dizziness and headaches.   Psychiatric/Behavioral: The patient is not nervous/anxious.       Physical Exam  Laboratory/Imaging   Hemodynamics  Temp (24hrs), Av.8 °C (98.3 °F), Min:36.7 °C (98 °F), Max:37.2 °C (98.9 °F)   Temperature: 36.7 °C (98.1 °F)  Pulse  Av.9  Min: 88  Max: 134 Heart Rate (Monitored): 95  Blood Pressure: 101/64, NIBP: 126/81      Respiratory      Respiration: 16, Pulse Oximetry: 94 %        RUL Breath Sounds: Clear, RML Breath Sounds: Clear, RLL Breath Sounds: Clear;Diminished, ISAIAS Breath Sounds: Clear, LLL Breath Sounds: Clear;Diminished    Fluids    Intake/Output Summary (Last 24 hours) at 18 0588  Last data filed at 18 579   Gross per 24 hour   Intake             1270 ml    Output              700 ml   Net              570 ml       Nutrition  Orders Placed This Encounter   Procedures   • DIET ORDER     Standing Status:   Standing     Number of Occurrences:   1     Order Specific Question:   Diet:     Answer:   Diabetic [3]     Order Specific Question:   Diet:     Answer:   Cardiac [6]     Physical Exam   Constitutional: He is oriented to person, place, and time. He appears well-developed and well-nourished. No distress.   obese   HENT:   Head: Normocephalic and atraumatic.   Nose: Nose normal.   Mouth/Throat: Oropharynx is clear and moist.   Eyes: Conjunctivae and EOM are normal. Right eye exhibits no discharge. Left eye exhibits no discharge. No scleral icterus.   Neck: No tracheal deviation present.   Cardiovascular: Normal rate, regular rhythm, normal heart sounds and intact distal pulses.    No murmur heard.  Pulmonary/Chest: Effort normal and breath sounds normal. No respiratory distress. He has no wheezes.   Abdominal: Soft. Bowel sounds are normal. He exhibits no distension. There is no tenderness.   Musculoskeletal: He exhibits no edema.   Neurological: He is alert and oriented to person, place, and time. No cranial nerve deficit.   Skin: Skin is warm. He is not diaphoretic.   Psychiatric: He has a normal mood and affect. His behavior is normal. Thought content normal.   Vitals reviewed.      Recent Labs      03/15/18   1701  03/16/18   0558  03/17/18   0843   WBC  13.8*  10.8  8.6   RBC  5.29  5.01  5.02   HEMOGLOBIN  14.9  14.6  14.4   HEMATOCRIT  44.2  42.1  42.1   MCV  83.6  84.0  83.9   MCH  28.2  29.1  28.7   MCHC  33.7  34.7  34.2   RDW  39.1  39.9  41.1   PLATELETCT  363  329  325   MPV  10.0  10.2  10.5     Recent Labs      03/16/18   0558  03/16/18   0933  03/17/18   0530   SODIUM  133*  133*  132*   POTASSIUM  3.4*  3.6  3.7   CHLORIDE  106  105  104   CO2  17*  17*  17*   GLUCOSE  136*  143*  260*   BUN  16  15  13   CREATININE  0.85  0.81  0.75   CALCIUM  9.4   9.6  9.7         Recent Labs      03/15/18   1028   BNPBTYPENAT  <2     Recent Labs      03/16/18   0146   TRIGLYCERIDE  172*   HDL  23*   LDL  74          Assessment/Plan     * Diabetic ketoacidosis without coma associated with type 2 diabetes mellitus (CMS-[facility])- (present on admission)   Assessment & Plan    Diabetic education required and discussed with patient and RN.  Await further RN education   Increased on his basal NPH.  Continue SSI and Accucheck  HgbA1c:13.3        Essential hypertension- (present on admission)   Assessment & Plan    Controlled on lisinopril  Monitor vitals.        Hyponatremia- (present on admission)   Assessment & Plan    Pseudo-, low due to glucose level, now markedly improved        Leukocytosis- (present on admission)   Assessment & Plan    Resolved, no antibiotics needed at this time        Shortness of breath- (present on admission)   Assessment & Plan    CT chest with no fluid overload, pneumonia or pulmonary cause him  Dyspnea on minor exertion  Negative stress test  Possible deconditioning  Monitor on telemetry  Normal TSH        Chest pain- (present on admission)   Assessment & Plan    Stress test negative, troponins are negative, EKG with no significant ST changes  Story is concerning, pain worse with activity  CTA chest negative for PE or thoracic abnormality  Echo difficult study but no gross valve abnormality          Quality-Core Measures   Reviewed items::  Radiology images reviewed, EKG reviewed, Labs reviewed and Medications reviewed  Hightower catheter::  No Hightower  DVT prophylaxis pharmacological::  Heparin

## 2018-03-17 NOTE — PROGRESS NOTES
"Pulmonary Critical Care Progress Note        Chief Complaint: DKA    History of Present Illness: \" The patient is a 57-year-old male with past medical history significant for obesity, hypertension and type 2 diabetes, who presented to urgent care today complaining of 2 weeks of intermittent chest pain, described as pressure like with associated shortness of breath and nausea.  He also was noted to have intermittent productive cough of white sputum, poor appetite and unintentional 20-pound weight loss.  He had recently been treated with a Z-Sameer for bronchitis.  Given his symptoms, he was sent from the urgent care to [facility] where he was found to be in diabetic ketoacidosis.  His EKG was nonspecific and his troponins have been negative x3.  He is being admitted to the intensive care unit for DKA management and I was consulted for assistance in his critical care management.\"       Review of Systems   Constitutional: Positive for malaise/fatigue. Negative for chills and fever.   Respiratory: Positive for shortness of breath. Negative for cough, sputum production and stridor.    Cardiovascular: Positive for chest pain.   Gastrointestinal: Negative for abdominal pain, nausea and vomiting.   Genitourinary: Negative for dysuria.   Musculoskeletal: Negative for myalgias.   Skin: Negative for rash.   Neurological: Negative for dizziness, sensory change and focal weakness.       Interval Events:  24 hour interval history reviewed   -No acute events overnight, transitioned off dka protocol yesterday   - Neuro: AOx4   - HR: 90s-100s sinus   - BP: 100s-120s systolic   - GI: ADA   - UOP: adequate   - Hightower: no   - Tm: afeb   - Lines: PIV   - PPx: GI PPI, DVT heparin   - RA    Yesterday   - Admitted overnight   - Neuro: AOx4   - HR: 90s-100s sinus   - BP: 100s-150s systolic   - GI: NPO   - UOP: 1L   - Hightower: no   - Tm: afeb   - Lines: PIV   - PPx: GI not indicated, DVT PPI   - AG closed, CO2 17    PFSH:  No " change.    Physical Exam   Constitutional: He is oriented to person, place, and time. He appears unhealthy.   HENT:   Head: Normocephalic and atraumatic.   Right Ear: External ear normal.   Left Ear: External ear normal.   Nose: Nose normal.   Mouth/Throat: Oropharynx is clear and moist.   Eyes: Conjunctivae and EOM are normal. Pupils are equal, round, and reactive to light. Right eye exhibits no discharge. Left eye exhibits no discharge.   Neck: Neck supple. No JVD present. No tracheal deviation present.   Cardiovascular: Normal rate, regular rhythm and normal heart sounds.    No murmur heard.  Pulmonary/Chest: Effort normal and breath sounds normal. No respiratory distress. He has no wheezes.   Abdominal: Soft. Bowel sounds are normal. He exhibits no distension. There is no tenderness.   Musculoskeletal: Normal range of motion. He exhibits no edema.   Neurological: He is alert and oriented to person, place, and time. No cranial nerve deficit. Coordination normal. GCS score is 15.   Skin: Skin is warm and dry. No rash noted.   Psychiatric: Affect normal.   Nursing note and vitals reviewed.      Respiratory:     Pulse Oximetry: 94 %  Chest Tube Drains:  ImagingAvailable data reviewed         Invalid input(s): [id number]    HemoDynamics:  Pulse: 98, Heart Rate (Monitored): 100  NIBP: 112/57     Imaging: Available data reviewed  Recent Labs      03/15/18   1028  03/15/18   1701  03/15/18   2150   TROPONINI  <0.01  <0.01  <0.01   BNPBTYPENAT  <2   --    --        Neuro:  GCS Total Winthrop Coma Score: 15  Imaging: Available data reviewed    Fluids:  Intake/Output       03/15/18 0700 - 03/16/18 0659 03/16/18 0700 - 03/17/18 0659 03/17/18 0700 - 03/18/18 0659      2726-7842 3999-1302 Total 6697-9049 7188-6001 Total 1624-2351 4912-2977 Total       Intake    P.O.  --  -- --  480  340 820  --  -- --    P.O. -- -- -- 480 340 820 -- -- --    I.V.  --  1453.1 1453.1  2140  20 2160  --  -- --    Magnesium Sulfate Volume --  50 50 -- -- -- -- -- --    Insulin Volume -- 315.6 315.6 240 -- 240 -- -- --    IV Volume (D5 1/2 NS) -- 262.5 262.5 -- -- -- -- -- --    IV Volume (D10 1/2 NS) -- 325 325 500 -- 500 -- -- --    IV Volume (Potassium phosphate) -- 500 500 -- -- -- -- -- --    IV Volume (Potassium) -- -- -- 400 -- 400 -- -- --    IV Volume (lactated ringers) -- -- -- 1000 -- 1000 -- -- --    IV Volume (NS TKO) -- -- -- -- 20 20 -- -- --    Other  --  -- --  50  -- 50  --  -- --    Medications (P.O./ Enteral Liquids) -- -- -- 50 -- 50 -- -- --    Total Intake -- 1453.1 1453.1 2670 360 3030 -- -- --       Output    Urine  --  1000 1000  1500  1550 3050  --  -- --    Void (ml) -- 1000 1000 1500 1550 3050 -- -- --    Stool  --  -- --  --  -- --  --  -- --    Number of Times Stooled -- 0 x 0 x 0 x 0 x 0 x -- -- --    Total Output -- 1000 1000 1500 1550 3050 -- -- --       Net I/O     -- 453.1 453.1 1170 -1190 -20 -- -- --          Recent Labs      03/15/18   1659   03/16/18   0146  03/16/18   0558  03/16/18   0933  03/17/18   0530   SODIUM   --    < >  132*  133*  133*  132*   POTASSIUM   --    < >  3.7  3.4*  3.6  3.7   CHLORIDE   --    < >  105  106  105  104   CO2   --    < >  16*  17*  17*  17*   BUN   --    < >  15  16  15  13   CREATININE   --    < >  0.88  0.85  0.81  0.75   MAGNESIUM  1.6   --   2.0   --   1.8  1.6   PHOSPHORUS  1.7*   --    --    --   2.4*  2.3*   CALCIUM   --    < >  9.2  9.4  9.6  9.7    < > = values in this interval not displayed.       GI/Nutrition:  Imaging: Available data reviewed  NPO  Liver Function  Recent Labs      03/15/18   1028  03/15/18   1701   03/16/18   0558  03/16/18   0933  03/17/18   0530   ALTSGPT  19  14   --    --    --    --    ASTSGOT  11*  9*   --    --    --    --    ALKPHOSPHAT  142*  111*   --    --    --    --    TBILIRUBIN  0.5  0.5   --    --    --    --    GLUCOSE  341*  216*   < >  136*  143*  260*    < > = values in this interval not displayed.       Heme:  Recent Labs       03/15/18   1028  03/15/18   1701  18   0558   RBC  5.72  5.29  5.01   HEMOGLOBIN  16.6  14.9  14.6   HEMATOCRIT  48.3  44.2  42.1   PLATELETCT  394  363  329       Infectious Disease:  Temp  Av.1 °C (98.8 °F)  Min: 36.4 °C (97.6 °F)  Max: 37.4 °C (99.4 °F)  Micro: cultures reviewed  Recent Labs      03/15/18   1028  03/15/18   1701  18   0558   WBC  15.1*  13.8*  10.8   NEUTSPOLYS  68.20  59.00  61.40   LYMPHOCYTES  24.60  30.60  26.60   MONOCYTES  5.80  8.50  9.00   EOSINOPHILS  0.30  0.90  1.90   BASOPHILS  0.50  0.50  0.60   ASTSGOT  11*  9*   --    ALTSGPT  19  14   --    ALKPHOSPHAT  142*  111*   --    TBILIRUBIN  0.5  0.5   --      Current Facility-Administered Medications   Medication Dose Frequency Provider Last Rate Last Dose   • insulin NPH (HUMULIN,NOVOLIN) injection 10 Units  10 Units BID INSULIN Neil Arceo Jr., D.O.   10 Units at 18 0729   • insulin regular (HUMULIN R) injection 2-9 Units  2-9 Units 4X/DAY ACHS Neil Arceo Jr., D.O.   3 Units at 18 0729    And   • glucose 4 g chewable tablet 16 g  16 g Q15 MIN PRN Neil Arceo Jr., D.O.        And   • dextrose 50% (D50W) injection 25 mL  25 mL Q15 MIN PRN Neil Arceo Jr., D.O.       • HYDROcodone-acetaminophen (NORCO) 5-325 MG per tablet 1-2 Tab  1-2 Tab Q8HRS PRN Neil Arceo Jr., D.O.   2 Tab at 18 0738   • omeprazole (PRILOSEC) capsule 20 mg  20 mg QAM AC Randell Avila M.D.   20 mg at 18 0601   • lisinopril (PRINIVIL) 10 MG tablet 10 mg  10 mg Q EVENING Randell Avila M.D.   10 mg at 18   • oxyCODONE CR (OXYCONTIN) tablet 40 mg  40 mg Q8HRS Randell Avila M.D.   40 mg at 18 0601   • aspirin (ASA) tablet 325 mg  325 mg DAILY Randell Avila M.D.   325 mg at 18 0738    Or   • aspirin (ASA) chewable tab 324 mg  324 mg DAILY Randell Avila M.D.        Or   • aspirin (ASA) suppository 300 mg  300 mg DAILY Randell Avila M.D.       • senna-docusate  (PERICOLACE or SENOKOT S) 8.6-50 MG per tablet 2 Tab  2 Tab BID Randell Avila M.D.        And   • polyethylene glycol/lytes (MIRALAX) PACKET 1 Packet  1 Packet QDAY PRCHERI Avila M.D.        And   • magnesium hydroxide (MILK OF MAGNESIA) suspension 30 mL  30 mL QDAY PRCHERI Avila M.D.        And   • bisacodyl (DULCOLAX) suppository 10 mg  10 mg QDAY PRCHERI Avila M.D.       • Respiratory Care per Protocol   Continuous RT Randell Avila M.D.       • heparin injection 5,000 Units  5,000 Units Q8HRS Randell Avila M.D.   5,000 Units at 03/17/18 0601   • acetaminophen (TYLENOL) tablet 650 mg  650 mg Q6HRS PRCHERI Avila M.D.       • ondansetron (ZOFRAN) syringe/vial injection 4 mg  4 mg Q4HRS PRCHERI Avila M.D.       • ondansetron (ZOFRAN ODT) dispertab 4 mg  4 mg Q4HRS PRCHERI Avila M.D.       • promethazine (PHENERGAN) tablet 12.5-25 mg  12.5-25 mg Q4HRS PRCHERI Avila M.D.       • promethazine (PHENERGAN) suppository 12.5-25 mg  12.5-25 mg Q4HRS PRCHERI Avila M.D.       • prochlorperazine (COMPAZINE) injection 5-10 mg  5-10 mg Q4HRS PRCHERI Avila M.D.         Last reviewed on 3/15/2018 11:52 AM by Shira Ruelas    Quality  Measures:   Labs reviewed, Radiology images reviewed and Medications reviewed   Higthower catheter: No Hightower       DVT Prophylaxis: Heparin  DVT prophylaxis - mechanical: SCDs  Ulcer prophylaxis: Yes      Problems/Plan:  Acute diabetic ketoacidosis   - likely secondary to either recent viral bronchitis vs medication changes   - Transitioned to subcutaneous NPH and aggressive sliding scale   -Blood glucose improving    Obesity   - RD consult   - Counseling    Systemic arterial hypertension   -Continue lisinopril    Chronic pain   -Continue home analgesia regimen    Chest pain   - Troponin negative ×3   - EKG nonspecific   - CTPA negative for PE   - Echo non-diagnostic   - NM Stress test pending    Intravascular  volume depletion   - Resolved with IV fluids    Hypophosphatemia   - Improving with replacement    Hypomagnesemia   -  Improving with replacement    Hyponatremia   - Continues to be a problem requiring aggressive replacement      Discussed patient condition and risk of morbidity and/or mortality with RN, RT, Pharmacy, Dietary, , Charge nurse / hot rounds and hospitalist.      More than 35 minutes was spent in pt exam, interview, and more than 60 % of this in discussion of plan, diagnoses, prognosis and disposition with patient, hospitalist, nurse and case management coordinator.

## 2018-03-17 NOTE — CARE PLAN
Problem: Safety  Goal: Will remain free from falls    Intervention: Assess risk factors for falls  Patient asked to call for assistance before attempting to get out of bed.      Problem: Knowledge Deficit  Goal: Knowledge of disease process/condition, treatment plan, diagnostic tests, and medications will improve    Intervention: Assess knowledge level of disease process/condition, treatment plan, diagnostic tests, and medications  Assess if patient needs further diabetic education

## 2018-03-18 LAB
BASOPHILS # BLD AUTO: 0.8 % (ref 0–1.8)
BASOPHILS # BLD: 0.06 K/UL (ref 0–0.12)
EOSINOPHIL # BLD AUTO: 0.22 K/UL (ref 0–0.51)
EOSINOPHIL NFR BLD: 2.9 % (ref 0–6.9)
ERYTHROCYTE [DISTWIDTH] IN BLOOD BY AUTOMATED COUNT: 41.4 FL (ref 35.9–50)
GLUCOSE BLD-MCNC: 234 MG/DL (ref 65–99)
GLUCOSE BLD-MCNC: 239 MG/DL (ref 65–99)
GLUCOSE BLD-MCNC: 277 MG/DL (ref 65–99)
GLUCOSE BLD-MCNC: 322 MG/DL (ref 65–99)
HCT VFR BLD AUTO: 43.6 % (ref 42–52)
HGB BLD-MCNC: 14.5 G/DL (ref 14–18)
IMM GRANULOCYTES # BLD AUTO: 0.03 K/UL (ref 0–0.11)
IMM GRANULOCYTES NFR BLD AUTO: 0.4 % (ref 0–0.9)
LYMPHOCYTES # BLD AUTO: 3.08 K/UL (ref 1–4.8)
LYMPHOCYTES NFR BLD: 40.2 % (ref 22–41)
MCH RBC QN AUTO: 28.2 PG (ref 27–33)
MCHC RBC AUTO-ENTMCNC: 33.3 G/DL (ref 33.7–35.3)
MCV RBC AUTO: 84.7 FL (ref 81.4–97.8)
MONOCYTES # BLD AUTO: 0.65 K/UL (ref 0–0.85)
MONOCYTES NFR BLD AUTO: 8.5 % (ref 0–13.4)
NEUTROPHILS # BLD AUTO: 3.63 K/UL (ref 1.82–7.42)
NEUTROPHILS NFR BLD: 47.2 % (ref 44–72)
NRBC # BLD AUTO: 0 K/UL
NRBC BLD-RTO: 0 /100 WBC
PLATELET # BLD AUTO: 330 K/UL (ref 164–446)
PMV BLD AUTO: 10.2 FL (ref 9–12.9)
RBC # BLD AUTO: 5.15 M/UL (ref 4.7–6.1)
WBC # BLD AUTO: 7.7 K/UL (ref 4.8–10.8)

## 2018-03-18 PROCEDURE — 82962 GLUCOSE BLOOD TEST: CPT

## 2018-03-18 PROCEDURE — A9270 NON-COVERED ITEM OR SERVICE: HCPCS | Performed by: INTERNAL MEDICINE

## 2018-03-18 PROCEDURE — 700102 HCHG RX REV CODE 250 W/ 637 OVERRIDE(OP): Performed by: INTERNAL MEDICINE

## 2018-03-18 PROCEDURE — 85025 COMPLETE CBC W/AUTO DIFF WBC: CPT

## 2018-03-18 PROCEDURE — 770020 HCHG ROOM/CARE - TELE (206)

## 2018-03-18 PROCEDURE — 99232 SBSQ HOSP IP/OBS MODERATE 35: CPT | Performed by: FAMILY MEDICINE

## 2018-03-18 PROCEDURE — 36415 COLL VENOUS BLD VENIPUNCTURE: CPT

## 2018-03-18 PROCEDURE — 700111 HCHG RX REV CODE 636 W/ 250 OVERRIDE (IP): Performed by: INTERNAL MEDICINE

## 2018-03-18 RX ORDER — DEXTROSE MONOHYDRATE 25 G/50ML
25 INJECTION, SOLUTION INTRAVENOUS 3 TIMES DAILY
Qty: 2250 ML | Refills: 0 | Status: SHIPPED | OUTPATIENT
Start: 2018-03-18 | End: 2018-04-17

## 2018-03-18 RX ADMIN — HEPARIN SODIUM 5000 UNITS: 5000 INJECTION, SOLUTION INTRAVENOUS; SUBCUTANEOUS at 15:10

## 2018-03-18 RX ADMIN — OXYCODONE HYDROCHLORIDE 40 MG: 40 TABLET, FILM COATED, EXTENDED RELEASE ORAL at 21:21

## 2018-03-18 RX ADMIN — OMEPRAZOLE 20 MG: 20 CAPSULE, DELAYED RELEASE ORAL at 05:25

## 2018-03-18 RX ADMIN — INSULIN HUMAN 4 UNITS: 100 INJECTION, SOLUTION PARENTERAL at 12:11

## 2018-03-18 RX ADMIN — DIBASIC SODIUM PHOSPHATE, MONOBASIC POTASSIUM PHOSPHATE AND MONOBASIC SODIUM PHOSPHATE 1 TABLET: 852; 155; 130 TABLET ORAL at 07:33

## 2018-03-18 RX ADMIN — DIBASIC SODIUM PHOSPHATE, MONOBASIC POTASSIUM PHOSPHATE AND MONOBASIC SODIUM PHOSPHATE 1 TABLET: 852; 155; 130 TABLET ORAL at 12:12

## 2018-03-18 RX ADMIN — ASPIRIN 325 MG: 325 TABLET ORAL at 07:33

## 2018-03-18 RX ADMIN — OXYCODONE HYDROCHLORIDE 40 MG: 40 TABLET, FILM COATED, EXTENDED RELEASE ORAL at 15:10

## 2018-03-18 RX ADMIN — HEPARIN SODIUM 5000 UNITS: 5000 INJECTION, SOLUTION INTRAVENOUS; SUBCUTANEOUS at 05:25

## 2018-03-18 RX ADMIN — DIBASIC SODIUM PHOSPHATE, MONOBASIC POTASSIUM PHOSPHATE AND MONOBASIC SODIUM PHOSPHATE 1 TABLET: 852; 155; 130 TABLET ORAL at 21:21

## 2018-03-18 RX ADMIN — INSULIN HUMAN 4 UNITS: 100 INJECTION, SOLUTION PARENTERAL at 05:31

## 2018-03-18 RX ADMIN — LISINOPRIL 10 MG: 5 TABLET ORAL at 21:21

## 2018-03-18 RX ADMIN — OXYCODONE HYDROCHLORIDE 40 MG: 40 TABLET, FILM COATED, EXTENDED RELEASE ORAL at 05:25

## 2018-03-18 RX ADMIN — HEPARIN SODIUM 5000 UNITS: 5000 INJECTION, SOLUTION INTRAVENOUS; SUBCUTANEOUS at 21:21

## 2018-03-18 RX ADMIN — INSULIN HUMAN 7 UNITS: 100 INJECTION, SOLUTION PARENTERAL at 16:49

## 2018-03-18 RX ADMIN — DIBASIC SODIUM PHOSPHATE, MONOBASIC POTASSIUM PHOSPHATE AND MONOBASIC SODIUM PHOSPHATE 1 TABLET: 852; 155; 130 TABLET ORAL at 16:59

## 2018-03-18 RX ADMIN — INSULIN HUMAN 10 UNITS: 100 INJECTION, SOLUTION PARENTERAL at 21:20

## 2018-03-18 ASSESSMENT — ENCOUNTER SYMPTOMS
PALPITATIONS: 0
HEADACHES: 0
NEUROLOGICAL NEGATIVE: 1
DIZZINESS: 0
STRIDOR: 0
ABDOMINAL PAIN: 0
CARDIOVASCULAR NEGATIVE: 1
BLOOD IN STOOL: 0
CONSTITUTIONAL NEGATIVE: 1
ORTHOPNEA: 1
FEVER: 0
WEIGHT LOSS: 0
RESPIRATORY NEGATIVE: 1
MUSCULOSKELETAL NEGATIVE: 1
BLURRED VISION: 0
EYES NEGATIVE: 1
GASTROINTESTINAL NEGATIVE: 1
SHORTNESS OF BREATH: 1
COUGH: 0
DIARRHEA: 0
NERVOUS/ANXIOUS: 0
EYE DISCHARGE: 0
NAUSEA: 0
BACK PAIN: 0

## 2018-03-18 ASSESSMENT — PAIN SCALES - GENERAL
PAINLEVEL_OUTOF10: 4
PAINLEVEL_OUTOF10: 7
PAINLEVEL_OUTOF10: 7
PAINLEVEL_OUTOF10: 4
PAINLEVEL_OUTOF10: 4
PAINLEVEL_OUTOF10: 3
PAINLEVEL_OUTOF10: 5

## 2018-03-18 NOTE — DISCHARGE PLANNING
Received call back from pharmacy and the cost for both types of insulin is $8.35 each. Beatriz from [facility] states that she does need an NPI number and ICD 10 code on the test strip script and then these should be covered by Medicare in full. AUGUSTO called Kayla hospitalist RN to let her know and she states that she will have the MD complete it and asked that the scripts just be left in the chart.     Plan: Scripts left on chart and MD can complete needed info and then pt can take to pharmacy tomorrow.

## 2018-03-18 NOTE — DISCHARGE SUMMARY
CHIEF COMPLAINT ON ADMISSION  Chief Complaint   Patient presents with   • Chest Pain       CODE STATUS  Full Code    HPI & HOSPITAL COURSE  This is a 57 y.o. male here with /h/o type II diabetes mellitus who presents with above chief complaint. Patient states that approximately 2 weeks ago he was helping a neighbor with the 50s on box up a stair as he was walking up the steroid got very short of breath and winded and put the box down. He developed chest pressure like sensation over the bilateral breast region lasted for approximate 5-10 minutes and went away after he stopped exerting himself. Since then he's had on and off chest pressure in the same region with the same severity in the same provocation factors. Never had chest pain like this before and also has associated shortness of breath but no associated nausea vomiting or diaphoresis. Patient states that he was on some form of insulin a few weeks ago presented himself off because he thought maybe was causing some of these recent symptoms he's been having. He is not sure what his blood sugars been either. Also claims that he's been having a decreased appetite in general has not been feeling well recently also and felt like he was almost in a pass out. Dies any new skin rashes. Patient was admitted and DKA protocol was initiated. Patient was started on IV fluids and insulin drip. Blood glucose has now improved. HgbA1c was found to be at 13. Patient is feeling better overall and notes that he has an outpatient appointment with Endocrinology in 2 weeks. A pending discharge will be placed so that patient may go home after he meets with diabetic educator    The patient met 2-midnight criteria for an inpatient stay at the time of discharge.    Therefore, he is discharged in fair and stable condition with close outpatient follow-up.    SPECIFIC OUTPATIENT FOLLOW-UP  None    DISCHARGE PROBLEM LIST  Principal Problem:    Diabetic ketoacidosis without coma associated  with type 2 diabetes mellitus (CMS-[facility]) POA: Yes  Active Problems:    Chest pain POA: Yes    Shortness of breath POA: Yes    Leukocytosis POA: Yes    Hyponatremia POA: Yes    Essential hypertension POA: Yes  Resolved Problems:    * No resolved hospital problems. *      FOLLOW UP  No future appointments.  No follow-up provider specified.    MEDICATIONS ON DISCHARGE   Dakota Cisneros   Home Medication Instructions KAMALA:[id number]    Printed on:03/18/18 0809   Medication Information                      azithromycin (ZITHROMAX) 250 MG Tab  Take 250-500 mg by mouth every day. Pt started a 5 day course on 3/2             esomeprazole (NEXIUM) 40 MG delayed-release capsule  Take 40 mg by mouth every morning before breakfast.             HYDROcodone-acetaminophen (NORCO) 5-325 MG Tab per tablet  Take 1-2 Tabs by mouth every four hours as needed.             lisinopril (PRINIVIL) 10 MG Tab  Take 10 mg by mouth every evening.             oxyCODONE CR (OXYCONTIN) 40 MG Tablet Extended Release 12 hour Abuse-Deterrent tablet  Take 40 mg by mouth every 8 hours.                 DIET  Orders Placed This Encounter   Procedures   • DIET ORDER     Standing Status:   Standing     Number of Occurrences:   1     Order Specific Question:   Diet:     Answer:   Diabetic [3]     Order Specific Question:   Diet:     Answer:   Cardiac [6]       ACTIVITY  As tolerated.  Weight bearing as tolerated      CONSULTATIONS  Pulmo    PROCEDURES  None    LABORATORY  Lab Results   Component Value Date/Time    SODIUM 132 (L) 03/17/2018 05:30 AM    POTASSIUM 3.7 03/17/2018 05:30 AM    CHLORIDE 104 03/17/2018 05:30 AM    CO2 17 (L) 03/17/2018 05:30 AM    GLUCOSE 260 (H) 03/17/2018 05:30 AM    BUN 13 03/17/2018 05:30 AM    CREATININE 0.75 03/17/2018 05:30 AM        Lab Results   Component Value Date/Time    WBC 7.7 03/18/2018 05:44 AM    HEMOGLOBIN 14.5 03/18/2018 05:44 AM    HEMATOCRIT 43.6 03/18/2018 05:44 AM    PLATELETCT 330 03/18/2018 05:44 AM         Total time of the discharge process exceeds 31 minutes

## 2018-03-18 NOTE — PROGRESS NOTES
[facility] Hospitalist Progress Note    Date of Service: 3/18/2018    Chief Complaint  57 y.o. male admitted 3/15/2018 with /h/o type II diabetes mellitus who presents with above chief complaint. Patient states that approximately 2 weeks ago he was helping a neighbor with the 50s on box up a stair as he was walking up the steroid got very short of breath and winded and put the box down. He developed chest pressure like sensation over the bilateral breast region lasted for approximate 5-10 minutes and went away after he stopped exerting himself. Since then he's had on and off chest pressure in the same region with the same severity in the same provocation factors. Never had chest pain like this before and also has associated shortness of breath but no associated nausea vomiting or diaphoresis. Patient states that he was on some form of insulin a few weeks ago presented himself off because he thought maybe was causing some of these recent symptoms he's been having. He is not sure what his blood sugars been either. Also claims that he's been having a decreased appetite in general has not been feeling well recently also and felt like he was almost in a pass out. Dies any new skin rashes    Interval Problem Update  Patient condition improving. Notes chest pain has resolved. Stress test negative. Awaiting diabetic education nurse.    Consultants/Specialty  None    Disposition  Home        Review of Systems   Constitutional: Negative.    HENT: Negative.    Eyes: Negative.    Respiratory: Negative.    Cardiovascular: Negative.  Negative for chest pain and palpitations.   Gastrointestinal: Negative.    Musculoskeletal: Negative.    Neurological: Negative.    All other systems reviewed and are negative.     Physical Exam  Laboratory/Imaging   Hemodynamics  Temp (24hrs), Av.8 °C (98.2 °F), Min:36.7 °C (98 °F), Max:36.9 °C (98.5 °F)   Temperature: 36.7 °C (98.1 °F)  Pulse  Av.9  Min: 88  Max: 134 Heart Rate (Monitored):  95  Blood Pressure: 101/64, NIBP: 126/81      Respiratory      Respiration: 16, Pulse Oximetry: 94 %        RUL Breath Sounds: Clear, RML Breath Sounds: Clear, RLL Breath Sounds: Clear;Diminished, ISAIAS Breath Sounds: Clear, LLL Breath Sounds: Clear;Diminished    Fluids    Intake/Output Summary (Last 24 hours) at 03/18/18 0807  Last data filed at 03/17/18 1958   Gross per 24 hour   Intake              790 ml   Output              250 ml   Net              540 ml       Nutrition  Orders Placed This Encounter   Procedures   • DIET ORDER     Standing Status:   Standing     Number of Occurrences:   1     Order Specific Question:   Diet:     Answer:   Diabetic [3]     Order Specific Question:   Diet:     Answer:   Cardiac [6]     Physical Exam   Constitutional: He is oriented to person, place, and time. He appears well-nourished. No distress.   HENT:   Head: Normocephalic and atraumatic.   Neck: Normal range of motion. Neck supple.   Cardiovascular: Normal rate, regular rhythm and normal heart sounds.    Pulmonary/Chest: Effort normal and breath sounds normal.   Abdominal: Soft. Bowel sounds are normal.   Musculoskeletal: Normal range of motion.   Neurological: He is alert and oriented to person, place, and time.   Skin: He is not diaphoretic.   Vitals reviewed.      Recent Labs      03/16/18   0558  03/17/18   0843  03/18/18   0544   WBC  10.8  8.6  7.7   RBC  5.01  5.02  5.15   HEMOGLOBIN  14.6  14.4  14.5   HEMATOCRIT  42.1  42.1  43.6   MCV  84.0  83.9  84.7   MCH  29.1  28.7  28.2   MCHC  34.7  34.2  33.3*   RDW  39.9  41.1  41.4   PLATELETCT  329  325  330   MPV  10.2  10.5  10.2     Recent Labs      03/16/18   0558  03/16/18   0933  03/17/18   0530   SODIUM  133*  133*  132*   POTASSIUM  3.4*  3.6  3.7   CHLORIDE  106  105  104   CO2  17*  17*  17*   GLUCOSE  136*  143*  260*   BUN  16  15  13   CREATININE  0.85  0.81  0.75   CALCIUM  9.4  9.6  9.7         Recent Labs      03/15/18   1028   BNPBTYPENAT  <2      Recent Labs      03/16/18   0146   TRIGLYCERIDE  172*   HDL  23*   LDL  74          Assessment/Plan     * Diabetic ketoacidosis without coma associated with type 2 diabetes mellitus (CMS-HCC)- (present on admission)   Assessment & Plan    Controlled  Diabetic education required and discussed with patient and RN.  Await further RN education   Increased on his basal NPH.  Continue SSI and Accucheck  HgbA1c:13.3        Essential hypertension- (present on admission)   Assessment & Plan    Controlled on lisinopril  Monitor vitals.        Hyponatremia- (present on admission)   Assessment & Plan    Pseudo-, low due to glucose level, now markedly improved        Leukocytosis- (present on admission)   Assessment & Plan    Resolved, no antibiotics needed at this time        Shortness of breath- (present on admission)   Assessment & Plan    CT chest with no fluid overload, pneumonia or pulmonary cause him  Dyspnea on minor exertion  Negative stress test  Possible deconditioning  Monitor on telemetry  Normal TSH        Chest pain- (present on admission)   Assessment & Plan    Stress test negative, troponins are negative, EKG with no significant ST changes  Story is concerning, pain worse with activity  CTA chest negative for PE or thoracic abnormality  Echo difficult study but no gross valve abnormality          Quality-Core Measures

## 2018-03-18 NOTE — PROGRESS NOTES
Two RN skin check complete.  Ears red and blanching (patient wears glasses).  Dry, cracked heels are red and blanching bilaterally.

## 2018-03-18 NOTE — PROGRESS NOTES
Report called to Sabine ARREOLA on Tele 8.  Patient taken via wheelchair with all belonging to T813 bed  1.

## 2018-03-18 NOTE — PROGRESS NOTES
Diabetic education with pt. Pt successfully edwin up and administered long acting and short acting insulin.

## 2018-03-18 NOTE — CARE PLAN
Problem: Communication  Goal: The ability to communicate needs accurately and effectively will improve  Outcome: PROGRESSING AS EXPECTED  Able to communicate effectively with nursing staff.    Problem: Knowledge Deficit  Goal: Knowledge of disease process/condition, treatment plan, diagnostic tests, and medications will improve  Outcome: PROGRESSING AS EXPECTED  Patient updated on today's plan of care.

## 2018-03-18 NOTE — PROGRESS NOTES
Patient instructed about self administering his insulin and demonstrated by giving himself 2 SubQ insulin injections.  Patient education pamphlet also provided to patient.  Order in epic for diabetic educator to see patient as soon as possible.

## 2018-03-18 NOTE — DISCHARGE PLANNING
Page received that we need to check cost of insulin and test strips for pt. SW sent scripts to [facility]([phone], [phone] fax).    Plan: Await cost of scripts.

## 2018-03-19 ENCOUNTER — PATIENT OUTREACH (OUTPATIENT)
Dept: HEALTH INFORMATION MANAGEMENT | Facility: OTHER | Age: 58
End: 2018-03-19

## 2018-03-19 VITALS
TEMPERATURE: 98.5 F | HEIGHT: 75 IN | HEART RATE: 87 BPM | WEIGHT: 296.3 LBS | DIASTOLIC BLOOD PRESSURE: 65 MMHG | RESPIRATION RATE: 18 BRPM | OXYGEN SATURATION: 91 % | BODY MASS INDEX: 36.84 KG/M2 | SYSTOLIC BLOOD PRESSURE: 104 MMHG

## 2018-03-19 LAB
ANION GAP SERPL CALC-SCNC: 10 MMOL/L (ref 0–11.9)
BASOPHILS # BLD AUTO: 0.7 % (ref 0–1.8)
BASOPHILS # BLD: 0.05 K/UL (ref 0–0.12)
BUN SERPL-MCNC: 13 MG/DL (ref 8–22)
CALCIUM SERPL-MCNC: 9.3 MG/DL (ref 8.5–10.5)
CHLORIDE SERPL-SCNC: 99 MMOL/L (ref 96–112)
CO2 SERPL-SCNC: 24 MMOL/L (ref 20–33)
CREAT SERPL-MCNC: 0.8 MG/DL (ref 0.5–1.4)
EOSINOPHIL # BLD AUTO: 0.16 K/UL (ref 0–0.51)
EOSINOPHIL NFR BLD: 2.3 % (ref 0–6.9)
ERYTHROCYTE [DISTWIDTH] IN BLOOD BY AUTOMATED COUNT: 40.6 FL (ref 35.9–50)
GLUCOSE BLD-MCNC: 216 MG/DL (ref 65–99)
GLUCOSE BLD-MCNC: 249 MG/DL (ref 65–99)
GLUCOSE SERPL-MCNC: 257 MG/DL (ref 65–99)
HCT VFR BLD AUTO: 41.1 % (ref 42–52)
HGB BLD-MCNC: 14.1 G/DL (ref 14–18)
IMM GRANULOCYTES # BLD AUTO: 0.02 K/UL (ref 0–0.11)
IMM GRANULOCYTES NFR BLD AUTO: 0.3 % (ref 0–0.9)
LYMPHOCYTES # BLD AUTO: 2.56 K/UL (ref 1–4.8)
LYMPHOCYTES NFR BLD: 37 % (ref 22–41)
MCH RBC QN AUTO: 29 PG (ref 27–33)
MCHC RBC AUTO-ENTMCNC: 34.3 G/DL (ref 33.7–35.3)
MCV RBC AUTO: 84.4 FL (ref 81.4–97.8)
MONOCYTES # BLD AUTO: 0.68 K/UL (ref 0–0.85)
MONOCYTES NFR BLD AUTO: 9.8 % (ref 0–13.4)
NEUTROPHILS # BLD AUTO: 3.45 K/UL (ref 1.82–7.42)
NEUTROPHILS NFR BLD: 49.9 % (ref 44–72)
NRBC # BLD AUTO: 0 K/UL
NRBC BLD-RTO: 0 /100 WBC
PLATELET # BLD AUTO: 298 K/UL (ref 164–446)
PMV BLD AUTO: 10.2 FL (ref 9–12.9)
POTASSIUM SERPL-SCNC: 3.3 MMOL/L (ref 3.6–5.5)
RBC # BLD AUTO: 4.87 M/UL (ref 4.7–6.1)
SODIUM SERPL-SCNC: 133 MMOL/L (ref 135–145)
WBC # BLD AUTO: 6.9 K/UL (ref 4.8–10.8)

## 2018-03-19 PROCEDURE — 99239 HOSP IP/OBS DSCHRG MGMT >30: CPT | Performed by: FAMILY MEDICINE

## 2018-03-19 PROCEDURE — 700102 HCHG RX REV CODE 250 W/ 637 OVERRIDE(OP): Performed by: INTERNAL MEDICINE

## 2018-03-19 PROCEDURE — 36415 COLL VENOUS BLD VENIPUNCTURE: CPT

## 2018-03-19 PROCEDURE — 85025 COMPLETE CBC W/AUTO DIFF WBC: CPT

## 2018-03-19 PROCEDURE — A9270 NON-COVERED ITEM OR SERVICE: HCPCS | Performed by: INTERNAL MEDICINE

## 2018-03-19 PROCEDURE — 80048 BASIC METABOLIC PNL TOTAL CA: CPT

## 2018-03-19 PROCEDURE — 700111 HCHG RX REV CODE 636 W/ 250 OVERRIDE (IP): Performed by: INTERNAL MEDICINE

## 2018-03-19 PROCEDURE — 82962 GLUCOSE BLOOD TEST: CPT | Mod: 91

## 2018-03-19 RX ADMIN — INSULIN HUMAN 4 UNITS: 100 INJECTION, SOLUTION PARENTERAL at 05:29

## 2018-03-19 RX ADMIN — INSULIN HUMAN 4 UNITS: 100 INJECTION, SOLUTION PARENTERAL at 11:53

## 2018-03-19 RX ADMIN — OXYCODONE HYDROCHLORIDE 40 MG: 40 TABLET, FILM COATED, EXTENDED RELEASE ORAL at 05:29

## 2018-03-19 RX ADMIN — HEPARIN SODIUM 5000 UNITS: 5000 INJECTION, SOLUTION INTRAVENOUS; SUBCUTANEOUS at 05:29

## 2018-03-19 RX ADMIN — ASPIRIN 325 MG: 325 TABLET ORAL at 07:54

## 2018-03-19 RX ADMIN — OMEPRAZOLE 20 MG: 20 CAPSULE, DELAYED RELEASE ORAL at 05:29

## 2018-03-19 ASSESSMENT — ENCOUNTER SYMPTOMS
NEUROLOGICAL NEGATIVE: 1
CONSTITUTIONAL NEGATIVE: 1
CARDIOVASCULAR NEGATIVE: 1
GASTROINTESTINAL NEGATIVE: 1
EYES NEGATIVE: 1
MUSCULOSKELETAL NEGATIVE: 1
RESPIRATORY NEGATIVE: 1

## 2018-03-19 ASSESSMENT — PAIN SCALES - GENERAL
PAINLEVEL_OUTOF10: 2
PAINLEVEL_OUTOF10: 6

## 2018-03-19 ASSESSMENT — LIFESTYLE VARIABLES: EVER_SMOKED: NEVER

## 2018-03-19 NOTE — CARE PLAN
Problem: Nutritional:  Goal: Achieve adequate nutritional intake  Patient will consume 50% of meals   Outcome: MET Date Met: 03/19/18

## 2018-03-19 NOTE — DIETARY
Nutrition: Day 4 of admit. Consult for diabetes meal planning education. Pt seen by dietitian on 3/16 and declined education as he as previous received education.

## 2018-03-19 NOTE — PROGRESS NOTES
Diabetes education: Met with patient and daughter. Please see consult note.  Plan: Pt has prescriptions for NPH, and Regular insulin as well as syringes and Freestyle lite test strips ( meter patient has but as he states his secondary changed to [employer] med[place] from [employer].)  Pt may need to change meters to either True metrix or One touch ultra for coverage. Pt to call Sabine ARREOLA if need to change after clarifying what meter/strips are covered, so she can get order from Dr. Goss. Daughter/pt to call CDE and CDE will provide meter tomorrow if needed ( pt does have some test strips). Please call [phone] if questions.

## 2018-03-19 NOTE — PROGRESS NOTES
Patient discharged home by the Physician.  Discharge instructions reviewed with patient including follow-up appointments, medications, and signs/symptoms to watch for.  Patient verbalized understanding.  PIV removed by RN with tip intact.

## 2018-03-19 NOTE — PROGRESS NOTES
[facility]ist Progress Note    Date of Service: 3/19/2018    Chief Complaint  57 y.o. male admitted 3/15/2018 with h/o type II diabetes mellitus who presents with above chief complaint. Patient states that approximately 2 weeks ago he was helping a neighbor with the 50s on box up a stair as he was walking up the steroid got very short of breath and winded and put the box down. He developed chest pressure like sensation over the bilateral breast region lasted for approximate 5-10 minutes and went away after he stopped exerting himself. Since then he's had on and off chest pressure in the same region with the same severity in the same provocation factors. Never had chest pain like this before and also has associated shortness of breath but no associated nausea vomiting or diaphoresis. Patient states that he was on some form of insulin a few weeks ago presented himself off because he thought maybe was causing some of these recent symptoms he's been having. He is not sure what his blood sugars been either. Also claims that he's been having a decreased appetite in general has not been feeling well recently also and felt like he was almost in a pass out. Dies any new skin rashes    Interval Problem Update  Denies any complaints. States he has a followup appointment with the endocrinologist in 2 weeks. Awaiting for diabetic educator prior to discharge    Consultants/Specialty  None    Disposition  Home        Review of Systems   Constitutional: Negative.    HENT: Negative.    Eyes: Negative.    Respiratory: Negative.    Cardiovascular: Negative.    Gastrointestinal: Negative.    Musculoskeletal: Negative.    Neurological: Negative.    All other systems reviewed and are negative.     Physical Exam  Laboratory/Imaging   Hemodynamics  Temp (24hrs), Av.8 °C (98.3 °F), Min:36.6 °C (97.8 °F), Max:37.3 °C (99.2 °F)   Temperature: 36.9 °C (98.5 °F)  Pulse  Av.6  Min: 69  Max: 134    Blood Pressure: 104/65       Respiratory      Respiration: 18, Pulse Oximetry: 91 %        RUL Breath Sounds: Clear, RML Breath Sounds: Clear, RLL Breath Sounds: Diminished, ISAIAS Breath Sounds: Clear, LLL Breath Sounds: Diminished    Fluids  No intake or output data in the 24 hours ending 03/19/18 0802    Nutrition  Orders Placed This Encounter   Procedures   • DIET ORDER     Standing Status:   Standing     Number of Occurrences:   1     Order Specific Question:   Diet:     Answer:   Diabetic [3]     Order Specific Question:   Diet:     Answer:   Cardiac [6]     Physical Exam   Constitutional: He is oriented to person, place, and time. No distress.   HENT:   Head: Normocephalic and atraumatic.   Neck: Normal range of motion. Neck supple.   Cardiovascular: Normal rate, regular rhythm and normal heart sounds.    Pulmonary/Chest: Effort normal and breath sounds normal.   Abdominal: Soft. Bowel sounds are normal.   Musculoskeletal: Normal range of motion.   Neurological: He is alert and oriented to person, place, and time.   Skin: He is not diaphoretic.   Vitals reviewed.      Recent Labs      03/17/18   0843  03/18/18   0544  03/19/18   0510   WBC  8.6  7.7  6.9   RBC  5.02  5.15  4.87   HEMOGLOBIN  14.4  14.5  14.1   HEMATOCRIT  42.1  43.6  41.1*   MCV  83.9  84.7  84.4   MCH  28.7  28.2  29.0   MCHC  34.2  33.3*  34.3   RDW  41.1  41.4  40.6   PLATELETCT  325  330  298   MPV  10.5  10.2  10.2     Recent Labs      03/16/18   0933  03/17/18   0530  03/19/18   0510   SODIUM  133*  132*  133*   POTASSIUM  3.6  3.7  3.3*   CHLORIDE  105  104  99   CO2  17*  17*  24   GLUCOSE  143*  260*  257*   BUN  15  13  13   CREATININE  0.81  0.75  0.80   CALCIUM  9.6  9.7  9.3                      Assessment/Plan     * Diabetic ketoacidosis without coma associated with type 2 diabetes mellitus (CMS-HCC)- (present on admission)   Assessment & Plan    Controlled  Diabetic education required and discussed with patient and RN.  Await further RN education    Increased on his basal NPH.  Continue SSI and Accucheck  HgbA1c:13.3        Essential hypertension- (present on admission)   Assessment & Plan    Controlled on lisinopril  Monitor vitals.        Hyponatremia- (present on admission)   Assessment & Plan    Pseudo-, low due to glucose level, now markedly improved        Leukocytosis- (present on admission)   Assessment & Plan    Resolved, no antibiotics needed at this time        Shortness of breath- (present on admission)   Assessment & Plan    CT chest with no fluid overload, pneumonia or pulmonary cause him  Dyspnea on minor exertion  Negative stress test  Possible deconditioning  Monitor on telemetry  Normal TSH        Chest pain- (present on admission)   Assessment & Plan    Stress test negative, troponins are negative, EKG with no significant ST changes  Story is concerning, pain worse with activity  CTA chest negative for PE or thoracic abnormality  Echo difficult study but no gross valve abnormality          Quality-Core Measures

## 2018-03-19 NOTE — DISCHARGE INSTRUCTIONS
Discharge Instructions    Discharged to home by car with relative. Discharged via walking, hospital escort: Refused.  Special equipment needed: Not Applicable    Be sure to schedule a follow-up appointment with your primary care doctor or any specialists as instructed.     Discharge Plan:   Diet Plan: Discussed  Activity Level: Discussed  Confirmed Follow up Appointment: Appointment Scheduled  Confirmed Symptoms Management: Discussed  Medication Reconciliation Updated: Yes  Influenza Vaccine Indication: Not indicated: Previously immunized this influenza season and > 8 years of age    I understand that a diet low in cholesterol, fat, and sodium is recommended for good health. Unless I have been given specific instructions below for another diet, I accept this instruction as my diet prescription.   Other diet: Diabetic    Special Instructions: None    · Is patient discharged on Warfarin / Coumadin?   No     Depression / Suicide Risk    As you are discharged from this [facility] facility, it is important to learn how to keep safe from harming yourself.    Recognize the warning signs:  · Abrupt changes in personality, positive or negative- including increase in energy   · Giving away possessions  · Change in eating patterns- significant weight changes-  positive or negative  · Change in sleeping patterns- unable to sleep or sleeping all the time   · Unwillingness or inability to communicate  · Depression  · Unusual sadness, discouragement and loneliness  · Talk of wanting to die  · Neglect of personal appearance   · Rebelliousness- reckless behavior  · Withdrawal from people/activities they love  · Confusion- inability to concentrate     If you or a loved one observes any of these behaviors or has concerns about self-harm, here's what you can do:  · Talk about it- your feelings and reasons for harming yourself  · Remove any means that you might use to hurt yourself (examples: pills, rope, extension cords,  firearm)  · Get professional help from the community (Mental Health, Substance Abuse, psychological counseling)  · Do not be alone:Call your Safe Contact- someone whom you trust who will be there for you.  · Call your local CRISIS HOTLINE 864-6788 or 289-852-2355  · Call your local Children's Mobile Crisis Response Team [place] (763) 815-3211 or www.[employer]  · Call the toll free National Suicide Prevention Hotlines   · National Suicide Prevention Lifeline 269-695-ISNK (8263)  · National Hope Line Network 800-SUICIDE (090-7298)    Isophane Insulin (NPH) injection  What is this medicine?  ISOPHANE INSULIN (NPH) (EYE susana fane IN wilson tomas) is a human-made form of insulin. This medicine lowers the amount of sugar in the blood. It is an intermediate-acting insulin that starts working about 1.5 hours after it is injected.  This medicine may be used for other purposes; ask your health care provider or pharmacist if you have questions.  COMMON BRAND NAME(S): Humulin N, Novolin N, ReliOn  What should I tell my health care provider before I take this medicine?  They need to know if you have any of these conditions:  -episodes of hypoglycemia  -kidney disease  -liver disease  -an unusual or allergic reaction to insulin, metacresol, other medicines, foods, dyes, or preservatives  -pregnant or trying to get pregnant  -breast-feeding  How should I use this medicine?  Insulin is for injection under the skin. Use exactly as directed. It is important to follow the directions given to you by your health care professional or doctor. You will be taught how to use this medicine and how to adjust doses for activities and illness. Do not use more insulin than prescribed. Do not use more or less often than prescribed.  Always check the appearance of your insulin before using it. This medicine should be white and cloudy. Do not use it if it is not uniformly cloudy after mixing.  It is important that you put your used needles and  syringes in a special sharps container. Do not put them in a trash can. If you do not have a sharps container, call your pharmacist or healthcare provider to get one.  Talk to your pediatrician regarding the use of this medicine in children. While this drug may be prescribed for children for selected conditions, precautions do apply.  Overdosage: If you think you have taken too much of this medicine contact a poison control center or emergency room at once.  NOTE: This medicine is only for you. Do not share this medicine with others.  What if I miss a dose?  It is important not to miss a dose. Your health care professional or doctor should discuss a plan for missed doses with you. If you do miss a dose, follow their plan. Do not take double doses.  What may interact with this medicine?  -other medicines for diabetes  Many medications may cause an increase or decrease in blood sugar, these include:  -alcohol containing beverages  -aspirin and aspirin-like drugs  -chloramphenicol  -chromium  -diuretics  -female hormones, like estrogens or progestins and birth control pills  -heart medicines  -isoniazid  -male hormones or anabolic steroids  -medicines for weight loss  -medicines for allergies, asthma, cold, or cough  -medicines for mental problems  -medicines called MAO Inhibitors like Nardil, Parnate, Marplan, Eldepryl  -niacin  -NSAIDs, medicines for pain and inflammation, like ibuprofen or naproxen  -pentamidine  -phenytoin  -probenecid  -quinolone antibiotics like ciprofloxacin, levofloxacin, ofloxacin  -some herbal dietary supplements  -steroid medicines like prednisone or cortisone  -thyroid medicine  Some medications can hide the warning symptoms of low blood sugar. You may need to monitor your blood sugar more closely if you are taking one of these medications. These include:  -beta-blockers such as atenolol, metoprolol, propranolol  -clonidine  -guanethidine  -reserpine  This list may not describe all possible  interactions. Give your health care provider a list of all the medicines, herbs, non-prescription drugs, or dietary supplements you use. Also tell them if you smoke, drink alcohol, or use illegal drugs. Some items may interact with your medicine.  What should I watch for while using this medicine?  Visit your health care professional or doctor for regular checks on your progress.  A test called the HbA1C (A1C) will be monitored. This is a simple blood test. It measures your blood sugar control over the last 2 to 3 months. You will receive this test every 3 to 6 months.  Learn how to check your blood sugar. Learn the symptoms of low and high blood sugar and how to manage them.  Always carry a quick-source of sugar with you in case you have symptoms of low blood sugar. Examples include hard sugar candy or glucose tablets. Make sure others know that you can choke if you eat or drink when you develop serious symptoms of low blood sugar, such as seizures or unconsciousness. They must get medical help at once.  Tell your doctor or health care professional if you have high blood sugar. You might need to change the dose of your medicine. If you are sick or exercising more than usual, you might need to change the dose of your medicine.  Do not skip meals. Ask your doctor or health care professional if you should avoid alcohol. Many nonprescription cough and cold products contain sugar or alcohol. These can affect blood sugar.  Make sure that you have the right kind of syringe for the type of insulin you use. Try not to change the brand and type of insulin or syringe unless your health care professional or doctor tells you to. Switching insulin brand or type can cause dangerously high or low blood sugar. Always keep an extra supply of insulin, syringes, and needles on hand. Use a syringe one time only. Throw away syringe and needle in a closed container to prevent accidental needle sticks.  Insulin pens and cartridges should  never be shared. Even if the needle is changed, sharing may result in passing of viruses like hepatitis or HIV.  Wear a medical ID bracelet or chain, and carry a card that describes your disease and details of your medicine and dosage times.  What side effects may I notice from receiving this medicine?  Side effects that you should report to your doctor or health care professional as soon as possible:  -allergic reactions like skin rash, itching or hives, swelling of the face, lips, or tongue  -breathing problems  -signs and symptoms of high blood sugar such as dizziness, dry mouth, dry skin, fruity breath, nausea, stomach pain, increased hunger or thirst, increased urination  -signs and symptoms of low blood sugar such as feeling anxious, confusion, dizziness, increased hunger, unusually weak or tired, sweating, shakiness, cold, irritable, headache, blurred vision, fast heartbeat, loss of consciousness  Side effects that usually do not require medical attention (report to your doctor or health care professional if they continue or are bothersome):  -increase or decrease in fatty tissue under the skin due to overuse of a particular injection site  -itching, burning, swelling, or rash at site where injected  This list may not describe all possible side effects. Call your doctor for medical advice about side effects. You may report side effects to FDA at 3-927-FDA-9906.  Where should I keep my medicine?  Keep out of the reach of children.  Store unopened insulin vials in a refrigerator between 2 and 8 degrees C (36 and 46 degrees F). Do not freeze or use if the insulin has been frozen. Store opened insulin vials in the refrigerator or at room temperature below 30 degrees C (86 degrees F). Keeping your insulin at room temperature decreases the amount of pain during injection. If you are using Humulin N brand vials, your open insulin vial should be thrown away after 31 days. If you are using Novolin N brand vials, your  open insulin vial should be thrown away after 42 days. Protect from heat and light.  Store unopened prefilled insulin pens in a refrigerator between 2 and 8 degrees C (36 and 46 degrees F.) Do not freeze or use if the insulin has been frozen. After opening, keep this medicine at room temperature below 30 degrees C (86 degrees F). Do not store in the refrigerator. Protect from heat and light. Throw away the insulin pen 14 days after opening.  NOTE: This sheet is a summary. It may not cover all possible information. If you have questions about this medicine, talk to your doctor, pharmacist, or health care provider.  © 2018 [employer]/Gold Standard (2017-01-19 10:17:48)  Regular Insulin injection  What is this medicine?  REGULAR INSULIN (REG yuh ler [place] katie marin) is a human-made form of insulin. This medicine lowers the amount of sugar in your blood. It is a short-acting insulin that starts working about 30 minutes after it is injected.  This medicine may be used for other purposes; ask your health care provider or pharmacist if you have questions.  COMMON BRAND NAME(S): Humulin R, Novolin R, ReliOn, Velosulin BR  What should I tell my health care provider before I take this medicine?  They need to know if you have any of these conditions:  -episodes of hypoglycemia  -kidney disease  -liver disease  -an unusual or allergic reaction to insulin, metacresol, other medicines, foods, dyes, or preservatives  -pregnant or trying to get pregnant  -breast-feeding  How should I use this medicine?  This medicine is for injection under the skin. Use exactly as directed. It is important to follow the directions given to you by your doctor or health care professional. Your doctor or health care professional will tell you how long to wait after you inject your dose before eating a meal. Most of the time, you should eat a meal within 30 minutes of your injection. You will be taught how to use this medicine and how to adjust doses for  activities and illness. Do not use more insulin than prescribed. Do not use more or less often than prescribed.  If you use U-500 insulin: Make sure you are using the right insulin vial prior to each use. You should only use a U-500 insulin syringe. Do not use a U-100 insulin syringe or a tuberculin syringe. The markings on each syringe are different. If you do not use the right syringe, you may take the wrong dose. This can lead to serious side effects.  Always check the appearance of your insulin before using it. This medicine should be clear and colorless like water. Do not use it if it is cloudy, thickened, colored, or has solid particles in it.  It is important that you put your used needles and syringes in a special sharps container. Do not put them in a trash can. If you do not have a sharps container, call your pharmacist or healthcare provider to get one.  Talk to your pediatrician regarding the use of this medicine in children. While this medicine may be prescribed for children for selected conditions, precautions do apply.  Overdosage: If you think you have taken too much of this medicine contact a poison control center or emergency room at once.  NOTE: This medicine is only for you. Do not share this medicine with others.  What if I miss a dose?  It is important not to miss a dose. Your health care professional or doctor should discuss a plan for missed doses with you. If you do miss a dose, follow their plan. Do not take double doses.  What may interact with this medicine?  -other medicines for diabetes  Many medications may cause an increase or decrease in blood sugar, these include:  -alcohol containing beverages  -aspirin and aspirin-like drugs  -chloramphenicol  -chromium  -diuretics  -female hormones, like estrogens or progestins and birth control pills  -heart medicines  -isoniazid  -male hormones or anabolic steroids  -medicines for weight loss  -medicines for allergies, asthma, cold, or  cough  -medicines for mental problems  -medicines called MAO Inhibitors like Nardil, Parnate, Marplan, Eldepryl  -niacin  -NSAIDs, medicines for pain and inflammation, like ibuprofen or naproxen  -pentamidine  -phenytoin  -probenecid  -quinolone antibiotics like ciprofloxacin, levofloxacin, ofloxacin  -some herbal dietary supplements  -steroid medicines like prednisone or cortisone  -thyroid medicine  Some medications can hide the warning symptoms of low blood sugar. You may need to monitor your blood sugar more closely if you are taking one of these medications. These include:  -beta-blockers such as atenolol, metoprolol, propranolol  -clonidine  -guanethidine  -reserpine  This list may not describe all possible interactions. Give your health care provider a list of all the medicines, herbs, non-prescription drugs, or dietary supplements you use. Also tell them if you smoke, drink alcohol, or use illegal drugs. Some items may interact with your medicine.  What should I watch for while using this medicine?  Visit your health care professional or doctor for regular checks on your progress.  A test called the HbA1C (A1C) will be monitored. This is a simple blood test. It measures your blood sugar control over the last 2 to 3 months. You will receive this test every 3 to 6 months.  Learn how to check your blood sugar. Learn the symptoms of low and high blood sugar and how to manage them.  Always carry a quick-source of sugar with you in case you have symptoms of low blood sugar. Examples include hard sugar candy or glucose tablets. Make sure others know that you can choke if you eat or drink when you develop serious symptoms of low blood sugar, such as seizures or unconsciousness. They must get medical help at once.  Tell your doctor or health care professional if you have high blood sugar. You might need to change the dose of your medicine. If you are sick or exercising more than usual, you might need to change the  dose of your medicine.  Do not skip meals. Ask your doctor or health care professional if you should avoid alcohol. Many nonprescription cough and cold products contain sugar or alcohol. These can affect blood sugar.  Make sure that you have the right kind of syringe for the type of insulin you use. Try not to change the brand and type of insulin or syringe unless your health care professional or doctor tells you to. Switching insulin brand or type can cause dangerously high or low blood sugar. Always keep an extra supply of insulin, syringes, and needles on hand. Use a syringe one time only. Throw away syringe and needle in a closed container to prevent accidental needle sticks.  Insulin pens and cartridges should never be shared. Even if the needle is changed, sharing may result in passing of viruses like hepatitis or HIV.  Wear a medical ID bracelet or chain, and carry a card that describes your disease and details of your medicine and dosage times.  What side effects may I notice from receiving this medicine?  Side effects that you should report to your doctor or health care professional as soon as possible:  -allergic reactions like skin rash, itching or hives, swelling of the face, lips, or tongue  -breathing problems  -signs and symptoms of high blood sugar such as dizziness, dry mouth, dry skin, fruity breath, nausea, stomach pain, increased hunger or thirst, increased urination  -signs and symptoms of low blood sugar such as feeling anxious, confusion, dizziness, increased hunger, unusually weak or tired, sweating, shakiness, cold, irritable, headache, blurred vision, fast heartbeat, loss of consciousness  Side effects that usually do not require medical attention (report to your doctor or health care professional if they continue or are bothersome):  -increase or decrease in fatty tissue under the skin due to overuse of a particular injection site  -itching, burning, swelling, or rash at site where  injected  This list may not describe all possible side effects. Call your doctor for medical advice about side effects. You may report side effects to FDA at 8-439-FDA-6663.  Where should I keep my medicine?  Keep out of the reach of children.  Store unopened insulin vials in a refrigerator between 2 and 8 degrees C (36 and 46 degrees F). Do not freeze or use if the insulin has been frozen. If unopened and in the refrigerator, your insulin can be used until the expiration date printed on the vial. Opened vials that are in use may be kept at room temperature to decrease the amount of pain during injection.  Novolin R vials (open vials currently in use): Store at room temperature, at approximately 25 degrees C (77 degrees F) or cooler. Do not refrigerate or freeze. Throw away after 42 days.  Humulin R U-100 vials (open vials currently in use): Store at room temperature, at approximately 30 degrees C (86 degrees F) or cooler. May store in the refrigerator. Do not freeze. Throw away after 31 days.  Humulin R U-500 (open vials currently in use): Store at room temperature, below 30 degrees C (86 degrees F) or cooler. May store in the refrigerator. Do not freeze. Throw away after 40 days.  Insulin Pens: Store unopened cartridges or disposable pens in a refrigerator between 2 and 8 degrees C (36 and 46 degrees F). Do not freeze or use if the insulin has been frozen. If stored at room temperature below 30 degrees C (86 degrees F), the insulin must be thrown away after 28 days. Once opened, store the disposable pens and cartridges that are inserted to pens at room temperature, approximately 30 degrees C (86 degrees F) or cooler. Do not store in the refrigerator. Once opened, the insulin can be used for 28 days. After 28 days, the cartridge or disposable pen should be thrown away.  Protect from light and excessive heat. Throw away any unused medicine after the expiration date or after the specified time for room temperature  storage has passed.  NOTE: This sheet is a summary. It may not cover all possible information. If you have questions about this medicine, talk to your doctor, pharmacist, or health care provider.  © 2018 [employer] Standard (2017-02-06 14:31:24)    Hypoglycemia  Hypoglycemia occurs when the level of sugar (glucose) in the blood is too low. Glucose is a type of sugar that provides the body's main source of energy. Certain hormones (insulin and glucagon) control the level of glucose in the blood. Insulin lowers blood glucose, and glucagon increases blood glucose. Hypoglycemia can result from having too much insulin in the bloodstream, or from not eating enough food that contains glucose.  Hypoglycemia can happen in people who do or do not have diabetes. It can develop quickly, and it can be a medical emergency.  What are the causes?  Hypoglycemia occurs most often in people who have diabetes. If you have diabetes, hypoglycemia may be caused by:  · Diabetes medicine.  · Not eating enough, or not eating often enough.  · Increased physical activity.  · Drinking alcohol, especially when you have not eaten recently.  If you do not have diabetes, hypoglycemia may be caused by:  · A tumor in the pancreas. The pancreas is the organ that makes insulin.  · Not eating enough, or not eating for long periods at a time (fasting).  · Severe infection or illness that affects the liver, heart, or kidneys.  · Certain medicines.  You may also have reactive hypoglycemia. This condition causes hypoglycemia within 4 hours of eating a meal. This may occur after having stomach surgery. Sometimes, the cause of reactive hypoglycemia is not known.  What increases the risk?  Hypoglycemia is more likely to develop in:  · People who have diabetes and take medicines to lower blood glucose.  · People who abuse alcohol.  · People who have a severe illness.  What are the signs or symptoms?  Hypoglycemia may not cause any symptoms. If you have  symptoms, they may include:  · Hunger.  · Anxiety.  · Sweating and feeling clammy.  · Confusion.  · Dizziness or feeling light-headed.  · Sleepiness.  · Nausea.  · Increased heart rate.  · Headache.  · Blurry vision.  · Seizure.  · Nightmares.  · Tingling or numbness around the mouth, lips, or tongue.  · A change in speech.  · Decreased ability to concentrate.  · A change in coordination.  · Restless sleep.  · Tremors or shakes.  · Fainting.  · Irritability.  How is this diagnosed?  Hypoglycemia is diagnosed with a blood test to measure your blood glucose level. This blood test is done while you are having symptoms. Your health care provider may also do a physical exam and review your medical history.  If you do not have diabetes, other tests may be done to find the cause of your hypoglycemia.  How is this treated?  This condition can often be treated by immediately eating or drinking something that contains glucose, such as:  · 3-4 sugar tablets (glucose pills).  · Glucose gel, 15-gram tube.  · Fruit juice, 4 oz (120 mL).  · Regular soda (not diet soda), 4 oz (120 mL).  · Low-fat milk, 4 oz (120 mL).  · Several pieces of hard candy.  · Sugar or honey, 1 Tbsp.  Treating Hypoglycemia If You Have Diabetes   If you are alert and able to swallow safely, follow the 15:15 rule:  · Take 15 grams of a rapid-acting carbohydrate. Rapid-acting options include:  ¨ 1 tube of glucose gel.  ¨ 3 glucose pills.  ¨ 6-8 pieces of hard candy.  ¨ 4 oz (120 mL) of fruit juice.  ¨ 4 oz (120 ml) of regular (not diet) soda.  · Check your blood glucose 15 minutes after you take the carbohydrate.  · If the repeat blood glucose level is still at or below 70 mg/dL (3.9 mmol/L), take 15 grams of a carbohydrate again.  · If your blood glucose level does not increase above 70 mg/dL (3.9 mmol/L) after 3 tries, seek emergency medical care.  · After your blood glucose level returns to normal, eat a meal or a snack within 1 hour.  Treating Severe  Hypoglycemia   Severe hypoglycemia is when your blood glucose level is at or below 54 mg/dL (3 mmol/L). Severe hypoglycemia is an emergency. Do not wait to see if the symptoms will go away. Get medical help right away. Call your local emergency services (911 in the [place] Do not drive yourself to the hospital.  If you have severe hypoglycemia and you cannot eat or drink, you may need an injection of glucagon. A family member or close friend should learn how to check your blood glucose and how to give you a glucagon injection. Ask your health care provider if you need to have an emergency glucagon injection kit available.  Severe hypoglycemia may need to be treated in a hospital. The treatment may include getting glucose through an IV tube. You may also need treatment for the cause of your hypoglycemia.  Follow these instructions at home:  General instructions  · Avoid any diets that cause you to not eat enough food. Talk with your health care provider before you start any new diet.  · Take over-the-counter and prescription medicines only as told by your health care provider.  · Limit alcohol intake to no more than 1 drink per day for nonpregnant women and 2 drinks per day for men. One drink equals 12 oz of beer, 5 oz of wine, or 1½ oz of hard liquor.  · Keep all follow-up visits as told by your health care provider. This is important.  If You Have Diabetes:   · Make sure you know the symptoms of hypoglycemia.  · Always have a rapid-acting carbohydrate snack with you to treat low blood sugar.  · Follow your diabetes management plan, as told by your health care provider. Make sure you:  ¨ Take your medicines as directed.  ¨ Follow your exercise plan.  ¨ Follow your meal plan. Eat on time, and do not skip meals.  ¨ Check your blood glucose as often as directed. Make sure to check your blood glucose before and after exercise. If you exercise longer or in a different way than usual, check your blood glucose more  often.  ¨ Follow your sick day plan whenever you cannot eat or drink normally. Make this plan in advance with your health care provider.  · Share your diabetes management plan with people in your workplace, school, and household.  · Check your urine for ketones when you are ill and as told by your health care provider.  · Carry a medical alert card or wear medical alert jewelry.  If You Have Reactive Hypoglycemia or Low Blood Sugar From Other Causes:  · Monitor your blood glucose as told by your health care provider.  · Follow instructions from your health care provider about eating or drinking restrictions.  Contact a health care provider if:  · You have problems keeping your blood glucose in your target range.  · You have frequent episodes of hypoglycemia.  Get help right away if:  · You continue to have hypoglycemia symptoms after eating or drinking something containing glucose.  · Your blood glucose is at or below 54 mg/dL (3 mmol/L).  · You have a seizure.  · You faint.  These symptoms may represent a serious problem that is an emergency. Do not wait to see if the symptoms will go away. Get medical help right away. Call your local emergency services (911 in the [place] Do not drive yourself to the hospital.   This information is not intended to replace advice given to you by your health care provider. Make sure you discuss any questions you have with your health care provider.  Document Released: 12/18/2006 Document Revised: 05/31/2017 Document Reviewed: 01/20/2017  Elsevier Interactive Patient Education © 2017 [employer]  Hyperglycemia  Hyperglycemia occurs when the level of sugar (glucose) in the blood is too high. Glucose is a type of sugar that provides the body's main source of energy. Certain hormones (insulin and glucagon) control the level of glucose in the blood. Insulin lowers blood glucose, and glucagon increases blood glucose. Hyperglycemia can result from having too little insulin in the  bloodstream, or from the body not responding normally to insulin.  Hyperglycemia occurs most often in people who have diabetes (diabetes mellitus), but it can happen in people who do not have diabetes. It can develop quickly, and it can be life-threatening if it causes you to become severely dehydrated (diabetic ketoacidosis or hyperglycemic hyperosmolar state). Severe hyperglycemia is a medical emergency.  What are the causes?  If you have diabetes, hyperglycemia may be caused by:  · Diabetes medicine.  · Medicines that increase blood glucose or affect your diabetes control.  · Not eating enough, or not eating often enough.  · Changes in physical activity level.  · Being sick or having an infection.  If you have prediabetes or undiagnosed diabetes:  · Hyperglycemia may be caused by those conditions.  If you do not have diabetes, hyperglycemia may be caused by:  · Certain medicines, including steroid medicines, beta-blockers, epinephrine, and thiazide diuretics.  · Stress.  · Serious illness.  · Surgery.  · Diseases of the pancreas.  · Infection.  What increases the risk?  Hyperglycemia is more likely to develop in people who have risk factors for diabetes, such as:  · Having a family member with diabetes.  · Having a gene for type 1 diabetes that is passed from parent to child (inherited).  · Living in an area with cold weather conditions.  · Exposure to certain viruses.  · Certain conditions in which the body's disease-fighting (immune) system attacks itself (autoimmune disorders).  · Being overweight or obese.  · Having an inactive (sedentary) lifestyle.  · Having been diagnosed with insulin resistance.  · Having a history of prediabetes, gestational diabetes, or polycystic ovarian syndrome (PCOS).  · Being of American-[place], -American, /, or / descent.  What are the signs or symptoms?  Hyperglycemia may not cause any symptoms. If you do have symptoms, they may include  early warning signs, such as:  · Increased thirst.  · Hunger.  · Feeling very tired.  · Needing to urinate more often than usual.  · Blurry vision.  Other symptoms may develop if hyperglycemia gets worse, such as:  · Dry mouth.  · Loss of appetite.  · Fruity-smelling breath.  · Weakness.  · Unexpected or rapid weight gain or weight loss.  · Tingling or numbness in the hands or feet.  · Headache.  · Skin that does not quickly return to normal after being lightly pinched and released (poor skin turgor).  · Abdominal pain.  · Cuts or bruises that are slow to heal.  How is this diagnosed?  Hyperglycemia is diagnosed with a blood test to measure your blood glucose level. This blood test is usually done while you are having symptoms. Your health care provider may also do a physical exam and review your medical history.  You may have more tests to determine the cause of your hyperglycemia, such as:  · A fasting blood glucose (FBG) test. You will not be allowed to eat (you will fast) for at least 8 hours before a blood sample is taken.  · An A1c (hemoglobin A1c) blood test. This provides information about blood glucose control over the previous 2-3 months.  · An oral glucose tolerance test (OGTT). This measures your blood glucose at two times:  ¨ After fasting. This is your baseline blood glucose level.  ¨ Two hours after drinking a beverage that contains glucose.  How is this treated?  Treatment depends on the cause of your hyperglycemia. Treatment may include:  · Taking medicine to regulate your blood glucose levels. If you take insulin or other diabetes medicines, your medicine or dosage may be adjusted.  · Lifestyle changes, such as exercising more, eating healthier foods, or losing weight.  · Treating an illness or infection, if this caused your hyperglycemia.  · Checking your blood glucose more often.  · Stopping or reducing steroid medicines, if these caused your hyperglycemia.  If your hyperglycemia becomes severe  and it results in hyperglycemic hyperosmolar state, you must be hospitalized and given IV fluids.  Follow these instructions at home:  General instructions  · Take over-the-counter and prescription medicines only as told by your health care provider.  · Do not use any products that contain nicotine or tobacco, such as cigarettes and e-cigarettes. If you need help quitting, ask your health care provider.  · Limit alcohol intake to no more than 1 drink per day for nonpregnant women and 2 drinks per day for men. One drink equals 12 oz of beer, 5 oz of wine, or 1½ oz of hard liquor.  · Learn to manage stress. If you need help with this, ask your health care provider.  · Keep all follow-up visits as told by your health care provider. This is important.  Eating and drinking  · Maintain a healthy weight.  · Exercise regularly, as directed by your health care provider.  · Stay hydrated, especially when you exercise, get sick, or spend time in hot temperatures.  · Eat healthy foods, such as:  ¨ Lean proteins.  ¨ Complex carbohydrates.  ¨ Fresh fruits and vegetables.  ¨ Low-fat dairy products.  ¨ Healthy fats.  · Drink enough fluid to keep your urine clear or pale yellow.  If you have diabetes:   · Make sure you know the symptoms of hyperglycemia.  · Follow your diabetes management plan, as told by your health care provider. Make sure you:  ¨ Take your insulin and medicines as directed.  ¨ Follow your exercise plan.  ¨ Follow your meal plan. Eat on time, and do not skip meals.  ¨ Check your blood glucose as often as directed. Make sure to check your blood glucose before and after exercise. If you exercise longer or in a different way than usual, check your blood glucose more often.  ¨ Follow your sick day plan whenever you cannot eat or drink normally. Make this plan in advance with your health care provider.  · Share your diabetes management plan with people in your workplace, school, and household.  · Check your urine for  ketones when you are ill and as told by your health care provider.  · Carry a medical alert card or wear medical alert jewelry.  Contact a health care provider if:  · Your blood glucose is at or above 240 mg/dL (13.3 mmol/L) for 2 days in a row.  · You have problems keeping your blood glucose in your target range.  · You have frequent episodes of hyperglycemia.  Get help right away if:  · You have difficulty breathing.  · You have a change in how you think, feel, or act (mental status).  · You have nausea or vomiting that does not go away.  These symptoms may represent a serious problem that is an emergency. Do not wait to see if the symptoms will go away. Get medical help right away. Call your local emergency services (911 in the [place] Do not drive yourself to the hospital.   Summary  · Hyperglycemia occurs when the level of sugar (glucose) in the blood is too high.  · Hyperglycemia is diagnosed with a blood test to measure your blood glucose level. This blood test is usually done while you are having symptoms. Your health care provider may also do a physical exam and review your medical history.  · If you have diabetes, follow your diabetes management plan as told by your health care provider.  · Contact your health care provider if you have problems keeping your blood glucose in your target range.  This information is not intended to replace advice given to you by your health care provider. Make sure you discuss any questions you have with your health care provider.  Document Released: 06/13/2002 Document Revised: 09/04/2017 Document Reviewed: 09/04/2017  [employer] Interactive Patient Education © 2017 [employer]    Diabetes Mellitus and Food  It is important for you to manage your blood sugar (glucose) level. Your blood glucose level can be greatly affected by what you eat. Eating healthier foods in the appropriate amounts throughout the day at about the same time each day will help you control your blood  glucose level. It can also help slow or prevent worsening of your diabetes mellitus. Healthy eating may even help you improve the level of your blood pressure and reach or maintain a healthy weight.  General recommendations for healthful eating and cooking habits include:  · Eating meals and snacks regularly. Avoid going long periods of time without eating to lose weight.  · Eating a diet that consists mainly of plant-based foods, such as fruits, vegetables, nuts, legumes, and whole grains.  · Using low-heat cooking methods, such as baking, instead of high-heat cooking methods, such as deep frying.  Work with your dietitian to make sure you understand how to use the Nutrition Facts information on food labels.  How can food affect me?  Carbohydrates   Carbohydrates affect your blood glucose level more than any other type of food. Your dietitian will help you determine how many carbohydrates to eat at each meal and teach you how to count carbohydrates. Counting carbohydrates is important to keep your blood glucose at a healthy level, especially if you are using insulin or taking certain medicines for diabetes mellitus.  Alcohol   Alcohol can cause sudden decreases in blood glucose (hypoglycemia), especially if you use insulin or take certain medicines for diabetes mellitus. Hypoglycemia can be a life-threatening condition. Symptoms of hypoglycemia (sleepiness, dizziness, and disorientation) are similar to symptoms of having too much alcohol.  If your health care provider has given you approval to drink alcohol, do so in moderation and use the following guidelines:  · Women should not have more than one drink per day, and men should not have more than two drinks per day. One drink is equal to:  ¨ 12 oz of beer.  ¨ 5 oz of wine.  ¨ 1½ oz of hard liquor.  · Do not drink on an empty stomach.  · Keep yourself hydrated. Have water, diet soda, or unsweetened iced tea.  · Regular soda, juice, and other mixers might contain a  lot of carbohydrates and should be counted.  What foods are not recommended?  As you make food choices, it is important to remember that all foods are not the same. Some foods have fewer nutrients per serving than other foods, even though they might have the same number of calories or carbohydrates. It is difficult to get your body what it needs when you eat foods with fewer nutrients. Examples of foods that you should avoid that are high in calories and carbohydrates but low in nutrients include:  · Trans fats (most processed foods list trans fats on the Nutrition Facts label).  · Regular soda.  · Juice.  · Candy.  · Sweets, such as cake, pie, doughnuts, and cookies.  · Fried foods.  What foods can I eat?  Eat nutrient-rich foods, which will nourish your body and keep you healthy. The food you should eat also will depend on several factors, including:  · The calories you need.  · The medicines you take.  · Your weight.  · Your blood glucose level.  · Your blood pressure level.  · Your cholesterol level.  You should eat a variety of foods, including:  · Protein.  ¨ Lean cuts of meat.  ¨ Proteins low in saturated fats, such as fish, egg whites, and beans. Avoid processed meats.  · Fruits and vegetables.  ¨ Fruits and vegetables that may help control blood glucose levels, such as apples, mangoes, and yams.  · Dairy products.  ¨ Choose fat-free or low-fat dairy products, such as milk, yogurt, and cheese.  · Grains, bread, pasta, and rice.  ¨ Choose whole grain products, such as multigrain bread, whole oats, and brown rice. These foods may help control blood pressure.  · Fats.  ¨ Foods containing healthful fats, such as nuts, avocado, olive oil, canola oil, and fish.  Does everyone with diabetes mellitus have the same meal plan?  Because every person with diabetes mellitus is different, there is not one meal plan that works for everyone. It is very important that you meet with a dietitian who will help you create a meal  plan that is just right for you.  This information is not intended to replace advice given to you by your health care provider. Make sure you discuss any questions you have with your health care provider.  Document Released: 09/14/2006 Document Revised: 05/25/2017 Document Reviewed: 11/14/2014  [employer] Interactive Patient Education © 2017 [employer]  Diabetes and Foot Care  Diabetes may cause you to have problems because of poor blood supply (circulation) to your feet and legs. This may cause the skin on your feet to become thinner, break easier, and heal more slowly. Your skin may become dry, and the skin may peel and crack. You may also have nerve damage in your legs and feet causing decreased feeling in them. You may not notice minor injuries to your feet that could lead to infections or more serious problems. Taking care of your feet is one of the most important things you can do for yourself.  Follow these instructions at home:  · Wear shoes at all times, even in the house. Do not go barefoot. Bare feet are easily injured.  · Check your feet daily for blisters, cuts, and redness. If you cannot see the bottom of your feet, use a mirror or ask someone for help.  · Wash your feet with warm water (do not use hot water) and mild soap. Then pat your feet and the areas between your toes until they are completely dry. Do not soak your feet as this can dry your skin.  · Apply a moisturizing lotion or petroleum jelly (that does not contain alcohol and is unscented) to the skin on your feet and to dry, brittle toenails. Do not apply lotion between your toes.  · Trim your toenails straight across. Do not dig under them or around the cuticle. File the edges of your nails with an emery board or nail file.  · Do not cut corns or calluses or try to remove them with medicine.  · Wear clean socks or stockings every day. Make sure they are not too tight. Do not wear knee-high stockings since they may decrease blood flow to  your legs.  · Wear shoes that fit properly and have enough cushioning. To break in new shoes, wear them for just a few hours a day. This prevents you from injuring your feet. Always look in your shoes before you put them on to be sure there are no objects inside.  · Do not cross your legs. This may decrease the blood flow to your feet.  · If you find a minor scrape, cut, or break in the skin on your feet, keep it and the skin around it clean and dry. These areas may be cleansed with mild soap and water. Do not cleanse the area with peroxide, alcohol, or iodine.  · When you remove an adhesive bandage, be sure not to damage the skin around it.  · If you have a wound, look at it several times a day to make sure it is healing.  · Do not use heating pads or hot water bottles. They may burn your skin. If you have lost feeling in your feet or legs, you may not know it is happening until it is too late.  · Make sure your health care provider performs a complete foot exam at least annually or more often if you have foot problems. Report any cuts, sores, or bruises to your health care provider immediately.  Contact a health care provider if:  · You have an injury that is not healing.  · You have cuts or breaks in the skin.  · You have an ingrown nail.  · You notice redness on your legs or feet.  · You feel burning or tingling in your legs or feet.  · You have pain or cramps in your legs and feet.  · Your legs or feet are numb.  · Your feet always feel cold.  Get help right away if:  · There is increasing redness, swelling, or pain in or around a wound.  · There is a red line that goes up your leg.  · Pus is coming from a wound.  · You develop a fever or as directed by your health care provider.  · You notice a bad smell coming from an ulcer or wound.  This information is not intended to replace advice given to you by your health care provider. Make sure you discuss any questions you have with your health care  provider.  Document Released: 12/15/2001 Document Revised: 05/25/2017 Document Reviewed: 05/27/2014  [employer] Interactive Patient Education © 2017 [employer]

## 2018-03-19 NOTE — CONSULTS
Diabetes education: Met with Dakota and his daughter for diabetes education. Sabine ARREOLA had already been working with patient on drawing up and injecting NPH and Regular insulin. Pt was on Tresiba previously. Pt's admitting blood sugar was 341 with Hg A1c of 13.3%.  Discussed how insulin works , especially NPH and Regular, when to give, how to mix (if wanted to), insulin storage, shelf life and site rotation. Discussed what effects blood sugars, effect of stress, pain ( patient has chronic pain with pain level of 5), and exercise( pt has limited activity). Discussed need to eat three meals and hs snack with carbohydrates and proteins and why ( especially needs hs snack with NPH ).  Pt to meet with Danita Washington in two weeks for his diabetes.  Pt is currently on NPH 15 units BID and sliding scale regular ac and hs. Current blood sugars are : 322  (10 units), 249 (4 units) and 216 (4 units). Pt would benefit with increase of NPH to get closer to goal of 80 to 150.   Handouts given to cover areas discussed.  Pt has prescriptions for NPH, and Regular insulin as well as syringes and Freestyle lite test strips ( meter patient has but as he states his secondary changed [place]  Pt may need to change meters to either True metrix or One touch ultra for coverage. Pt to call Sabine ARREOLA if need to change after clarifying what meter/strips are covered, so she can get order from Dr. Goss. Daughter/pt to call CDE and CDE will provide meter tomorrow if needed ( pt does have some test strips). Please call [phone] if questions.

## 2018-03-27 NOTE — PROGRESS NOTES
Patient demonstrates ability and understanding on how to self-administer insulin SQ.   Pt is traveling and needs early refill on ambien.

## 2018-04-26 PROCEDURE — 99285 EMERGENCY DEPT VISIT HI MDM: CPT

## 2018-04-26 ASSESSMENT — PAIN SCALES - GENERAL: PAINLEVEL_OUTOF10: 7

## 2018-04-27 ENCOUNTER — HOSPITAL ENCOUNTER (INPATIENT)
Facility: MEDICAL CENTER | Age: 58
LOS: 1 days | DRG: 392 | End: 2018-04-28
Attending: EMERGENCY MEDICINE | Admitting: HOSPITALIST
Payer: MEDICARE

## 2018-04-27 ENCOUNTER — APPOINTMENT (OUTPATIENT)
Dept: RADIOLOGY | Facility: MEDICAL CENTER | Age: 58
DRG: 392 | End: 2018-04-27
Attending: EMERGENCY MEDICINE
Payer: MEDICARE

## 2018-04-27 DIAGNOSIS — A09 DIARRHEA OF INFECTIOUS ORIGIN: ICD-10-CM

## 2018-04-27 PROBLEM — E11.9 T2DM (TYPE 2 DIABETES MELLITUS) (HCC): Status: ACTIVE | Noted: 2018-04-27

## 2018-04-27 PROBLEM — R19.7 DIARRHEA: Status: ACTIVE | Noted: 2018-04-27

## 2018-04-27 LAB
ALBUMIN SERPL BCP-MCNC: 3.6 G/DL (ref 3.2–4.9)
ALBUMIN/GLOB SERPL: 1.5 G/DL
ALP SERPL-CCNC: 110 U/L (ref 30–99)
ALT SERPL-CCNC: 12 U/L (ref 2–50)
ANION GAP SERPL CALC-SCNC: 9 MMOL/L (ref 0–11.9)
AST SERPL-CCNC: 9 U/L (ref 12–45)
BASOPHILS # BLD AUTO: 0.3 % (ref 0–1.8)
BASOPHILS # BLD: 0.05 K/UL (ref 0–0.12)
BILIRUB SERPL-MCNC: 0.2 MG/DL (ref 0.1–1.5)
BUN SERPL-MCNC: 16 MG/DL (ref 8–22)
C DIFF DNA SPEC QL NAA+PROBE: NEGATIVE
C DIFF TOX GENS STL QL NAA+PROBE: NEGATIVE
CALCIUM SERPL-MCNC: 8.9 MG/DL (ref 8.5–10.5)
CHLORIDE SERPL-SCNC: 107 MMOL/L (ref 96–112)
CO2 SERPL-SCNC: 24 MMOL/L (ref 20–33)
CREAT SERPL-MCNC: 0.98 MG/DL (ref 0.5–1.4)
EOSINOPHIL # BLD AUTO: 0.45 K/UL (ref 0–0.51)
EOSINOPHIL NFR BLD: 2.5 % (ref 0–6.9)
ERYTHROCYTE [DISTWIDTH] IN BLOOD BY AUTOMATED COUNT: 42.4 FL (ref 35.9–50)
GLOBULIN SER CALC-MCNC: 2.4 G/DL (ref 1.9–3.5)
GLUCOSE BLD-MCNC: 163 MG/DL (ref 65–99)
GLUCOSE BLD-MCNC: 183 MG/DL (ref 65–99)
GLUCOSE SERPL-MCNC: 205 MG/DL (ref 65–99)
HCT VFR BLD AUTO: 45.6 % (ref 42–52)
HGB BLD-MCNC: 15 G/DL (ref 14–18)
IMM GRANULOCYTES # BLD AUTO: 0.24 K/UL (ref 0–0.11)
IMM GRANULOCYTES NFR BLD AUTO: 1.3 % (ref 0–0.9)
LIPASE SERPL-CCNC: 8 U/L (ref 11–82)
LYMPHOCYTES # BLD AUTO: 2.95 K/UL (ref 1–4.8)
LYMPHOCYTES NFR BLD: 16.4 % (ref 22–41)
MCH RBC QN AUTO: 28.4 PG (ref 27–33)
MCHC RBC AUTO-ENTMCNC: 32.9 G/DL (ref 33.7–35.3)
MCV RBC AUTO: 86.4 FL (ref 81.4–97.8)
MONOCYTES # BLD AUTO: 1.38 K/UL (ref 0–0.85)
MONOCYTES NFR BLD AUTO: 7.7 % (ref 0–13.4)
NEUTROPHILS # BLD AUTO: 12.88 K/UL (ref 1.82–7.42)
NEUTROPHILS NFR BLD: 71.8 % (ref 44–72)
NRBC # BLD AUTO: 0 K/UL
NRBC BLD-RTO: 0 /100 WBC
PLATELET # BLD AUTO: 363 K/UL (ref 164–446)
PMV BLD AUTO: 9.8 FL (ref 9–12.9)
POTASSIUM SERPL-SCNC: 3.4 MMOL/L (ref 3.6–5.5)
PROT SERPL-MCNC: 6 G/DL (ref 6–8.2)
RBC # BLD AUTO: 5.28 M/UL (ref 4.7–6.1)
SODIUM SERPL-SCNC: 140 MMOL/L (ref 135–145)
WBC # BLD AUTO: 18 K/UL (ref 4.8–10.8)

## 2018-04-27 PROCEDURE — 700102 HCHG RX REV CODE 250 W/ 637 OVERRIDE(OP): Performed by: HOSPITALIST

## 2018-04-27 PROCEDURE — 96372 THER/PROPH/DIAG INJ SC/IM: CPT

## 2018-04-27 PROCEDURE — 82962 GLUCOSE BLOOD TEST: CPT | Mod: 91

## 2018-04-27 PROCEDURE — 87493 C DIFF AMPLIFIED PROBE: CPT

## 2018-04-27 PROCEDURE — 99223 1ST HOSP IP/OBS HIGH 75: CPT | Performed by: HOSPITALIST

## 2018-04-27 PROCEDURE — 85025 COMPLETE CBC W/AUTO DIFF WBC: CPT

## 2018-04-27 PROCEDURE — 700111 HCHG RX REV CODE 636 W/ 250 OVERRIDE (IP): Performed by: HOSPITALIST

## 2018-04-27 PROCEDURE — 80053 COMPREHEN METABOLIC PANEL: CPT

## 2018-04-27 PROCEDURE — 770006 HCHG ROOM/CARE - MED/SURG/GYN SEMI*

## 2018-04-27 PROCEDURE — 74177 CT ABD & PELVIS W/CONTRAST: CPT

## 2018-04-27 PROCEDURE — 83690 ASSAY OF LIPASE: CPT

## 2018-04-27 PROCEDURE — A9270 NON-COVERED ITEM OR SERVICE: HCPCS | Performed by: HOSPITALIST

## 2018-04-27 PROCEDURE — 96365 THER/PROPH/DIAG IV INF INIT: CPT

## 2018-04-27 PROCEDURE — 700105 HCHG RX REV CODE 258: Performed by: EMERGENCY MEDICINE

## 2018-04-27 PROCEDURE — 700117 HCHG RX CONTRAST REV CODE 255: Performed by: EMERGENCY MEDICINE

## 2018-04-27 PROCEDURE — 36415 COLL VENOUS BLD VENIPUNCTURE: CPT

## 2018-04-27 PROCEDURE — 700111 HCHG RX REV CODE 636 W/ 250 OVERRIDE (IP): Performed by: EMERGENCY MEDICINE

## 2018-04-27 PROCEDURE — 96361 HYDRATE IV INFUSION ADD-ON: CPT

## 2018-04-27 PROCEDURE — 700101 HCHG RX REV CODE 250: Performed by: HOSPITALIST

## 2018-04-27 PROCEDURE — 96375 TX/PRO/DX INJ NEW DRUG ADDON: CPT

## 2018-04-27 RX ORDER — DICYCLOMINE HYDROCHLORIDE 10 MG/ML
20 INJECTION INTRAMUSCULAR ONCE
Status: COMPLETED | OUTPATIENT
Start: 2018-04-27 | End: 2018-04-27

## 2018-04-27 RX ORDER — ONDANSETRON 2 MG/ML
4 INJECTION INTRAMUSCULAR; INTRAVENOUS ONCE
Status: DISCONTINUED | OUTPATIENT
Start: 2018-04-27 | End: 2018-04-27

## 2018-04-27 RX ORDER — SODIUM CHLORIDE 9 MG/ML
1000 INJECTION, SOLUTION INTRAVENOUS ONCE
Status: COMPLETED | OUTPATIENT
Start: 2018-04-27 | End: 2018-04-27

## 2018-04-27 RX ORDER — AMOXICILLIN 250 MG
2 CAPSULE ORAL 2 TIMES DAILY
Status: DISCONTINUED | OUTPATIENT
Start: 2018-04-27 | End: 2018-04-28 | Stop reason: HOSPADM

## 2018-04-27 RX ORDER — POLYETHYLENE GLYCOL 3350 17 G/17G
1 POWDER, FOR SOLUTION ORAL
Status: DISCONTINUED | OUTPATIENT
Start: 2018-04-27 | End: 2018-04-28 | Stop reason: HOSPADM

## 2018-04-27 RX ORDER — METOCLOPRAMIDE HYDROCHLORIDE 5 MG/ML
10 INJECTION INTRAMUSCULAR; INTRAVENOUS ONCE
Status: COMPLETED | OUTPATIENT
Start: 2018-04-27 | End: 2018-04-27

## 2018-04-27 RX ORDER — METOCLOPRAMIDE 10 MG/1
10 TABLET ORAL 2 TIMES DAILY
COMMUNITY

## 2018-04-27 RX ORDER — LISINOPRIL 10 MG/1
10 TABLET ORAL EVERY EVENING
Status: DISCONTINUED | OUTPATIENT
Start: 2018-04-27 | End: 2018-04-28 | Stop reason: HOSPADM

## 2018-04-27 RX ORDER — SODIUM CHLORIDE 9 MG/ML
500 INJECTION, SOLUTION INTRAVENOUS
Status: ACTIVE | OUTPATIENT
Start: 2018-04-27 | End: 2018-04-27

## 2018-04-27 RX ORDER — OXYCODONE HCL 20 MG/1
40 TABLET, FILM COATED, EXTENDED RELEASE ORAL EVERY 8 HOURS
Status: DISCONTINUED | OUTPATIENT
Start: 2018-04-27 | End: 2018-04-28 | Stop reason: HOSPADM

## 2018-04-27 RX ORDER — SODIUM CHLORIDE AND POTASSIUM CHLORIDE 150; 900 MG/100ML; MG/100ML
INJECTION, SOLUTION INTRAVENOUS CONTINUOUS
Status: ACTIVE | OUTPATIENT
Start: 2018-04-27 | End: 2018-04-28

## 2018-04-27 RX ORDER — BISACODYL 10 MG
10 SUPPOSITORY, RECTAL RECTAL
Status: DISCONTINUED | OUTPATIENT
Start: 2018-04-27 | End: 2018-04-28 | Stop reason: HOSPADM

## 2018-04-27 RX ORDER — INSULIN GLARGINE 100 [IU]/ML
60 INJECTION, SOLUTION SUBCUTANEOUS EVERY EVENING
COMMUNITY
End: 2018-08-20

## 2018-04-27 RX ADMIN — OXYCODONE HYDROCHLORIDE 40 MG: 40 TABLET, FILM COATED, EXTENDED RELEASE ORAL at 20:58

## 2018-04-27 RX ADMIN — OXYCODONE HYDROCHLORIDE 40 MG: 40 TABLET, FILM COATED, EXTENDED RELEASE ORAL at 08:21

## 2018-04-27 RX ADMIN — METOCLOPRAMIDE 10 MG: 5 INJECTION, SOLUTION INTRAMUSCULAR; INTRAVENOUS at 01:40

## 2018-04-27 RX ADMIN — CEFTRIAXONE 2 G: 2 INJECTION, POWDER, FOR SOLUTION INTRAMUSCULAR; INTRAVENOUS at 07:05

## 2018-04-27 RX ADMIN — DICYCLOMINE HYDROCHLORIDE 20 MG: 10 INJECTION INTRAMUSCULAR at 01:10

## 2018-04-27 RX ADMIN — INSULIN HUMAN 15 UNITS: 100 INJECTION, SUSPENSION SUBCUTANEOUS at 10:42

## 2018-04-27 RX ADMIN — METRONIDAZOLE 500 MG: 500 INJECTION, SOLUTION INTRAVENOUS at 08:22

## 2018-04-27 RX ADMIN — METRONIDAZOLE 500 MG: 500 INJECTION, SOLUTION INTRAVENOUS at 20:58

## 2018-04-27 RX ADMIN — POTASSIUM CHLORIDE AND SODIUM CHLORIDE: 900; 150 INJECTION, SOLUTION INTRAVENOUS at 10:42

## 2018-04-27 RX ADMIN — LISINOPRIL 10 MG: 10 TABLET ORAL at 20:58

## 2018-04-27 RX ADMIN — IOHEXOL 100 ML: 350 INJECTION, SOLUTION INTRAVENOUS at 05:38

## 2018-04-27 RX ADMIN — OXYCODONE HYDROCHLORIDE 40 MG: 40 TABLET, FILM COATED, EXTENDED RELEASE ORAL at 13:59

## 2018-04-27 RX ADMIN — INSULIN HUMAN 15 UNITS: 100 INJECTION, SUSPENSION SUBCUTANEOUS at 17:26

## 2018-04-27 RX ADMIN — METRONIDAZOLE 500 MG: 500 INJECTION, SOLUTION INTRAVENOUS at 13:59

## 2018-04-27 RX ADMIN — SODIUM CHLORIDE 1000 ML: 9 INJECTION, SOLUTION INTRAVENOUS at 01:31

## 2018-04-27 ASSESSMENT — LIFESTYLE VARIABLES: EVER_SMOKED: NEVER

## 2018-04-27 ASSESSMENT — PAIN SCALES - GENERAL
PAINLEVEL_OUTOF10: 7
PAINLEVEL_OUTOF10: 0
PAINLEVEL_OUTOF10: 0

## 2018-04-27 ASSESSMENT — ENCOUNTER SYMPTOMS
NAUSEA: 1
SORE THROAT: 0
HEADACHES: 0
ROS GI COMMENTS: NEGATIVE FOR HEMATOCHEZIA.
VOMITING: 1
DIARRHEA: 1
WHEEZING: 0
ABDOMINAL PAIN: 0
DIZZINESS: 0
PALPITATIONS: 0
COUGH: 0
SHORTNESS OF BREATH: 0
PHOTOPHOBIA: 0
CHILLS: 1
FOCAL WEAKNESS: 0
FEVER: 1
MYALGIAS: 0
DEPRESSION: 0
TINGLING: 0
ABDOMINAL PAIN: 1

## 2018-04-27 NOTE — H&P
" Hospital Medicine History and Physical    Date of Service  4/27/2018    Chief Complaint  Chief Complaint   Patient presents with   • Nausea/Vomiting/Diarrhea     \"I woke up Wednesday morning with diarrhea and abd pain. I haven't been eating much. Then I started vomiting today.\"   • Abdominal Pain     generalized       History of Presenting Illness  57 y.o. male who presented on 4/27/2018 with nausea, vomiting, and diarrhea. Patient states that had acute onset nausea, vomiting, and diarrhea approximately 9 days ago. He denies any recent antibiotic use but did complete a course of azithromycin and early March. He's had subjective fevers, chills, and abdominal cramping. He denies any recent foreign travel, no sick contacts with similar symptoms, no changes in diet, thickness, camping, or hiking. Along with his symptoms, he has also had poor by mouth intake.      Primary Care Physician  Shawn Fischer M.D.    Consultants  None    Code Status  Full    Review of Systems  Review of Systems   Constitutional: Positive for chills and fever.   HENT: Negative for congestion and sore throat.    Eyes: Negative for photophobia.   Respiratory: Negative for cough, shortness of breath and wheezing.    Cardiovascular: Negative for chest pain and palpitations.   Gastrointestinal: Positive for diarrhea, nausea and vomiting. Negative for abdominal pain.   Genitourinary: Negative for dysuria.   Musculoskeletal: Negative for myalgias.   Skin: Negative.    Neurological: Negative for dizziness, tingling, focal weakness and headaches.   Psychiatric/Behavioral: Negative for depression and suicidal ideas.        Past Medical History  Diabetes mellitus, hypertension, chronic pain    Surgical History  Reviewed and patient denies    Medications  No current facility-administered medications on file prior to encounter.      Current Outpatient Prescriptions on File Prior to Encounter   Medication Sig Dispense Refill   • insulin NPH (HUMULIN,NOVOLIN) " 100 UNIT/ML Suspension Inject 15 Units as instructed 2 Times a Day. 10 mL 0   • glucose 4 g 4 g Chew Tab Take 4 Tabs by mouth as needed for Low Blood Sugar (If FSBG is less than or equal to 70 mg/dL and patient able to eat or drink). 30 Tab 0   • insulin regular (HUMULIN R) 100 Unit/mL Solution Inject 3-14 Units as instructed 4 Times a Day,Before Meals and at Bedtime. 10 mL 0   • HYDROcodone-acetaminophen (NORCO) 5-325 MG Tab per tablet Take 1-2 Tabs by mouth every four hours as needed.     • lisinopril (PRINIVIL) 10 MG Tab Take 10 mg by mouth every evening.     • esomeprazole (NEXIUM) 40 MG delayed-release capsule Take 40 mg by mouth every morning before breakfast.     • oxyCODONE CR (OXYCONTIN) 40 MG Tablet Extended Release 12 hour Abuse-Deterrent tablet Take 40 mg by mouth every 8 hours.     • azithromycin (ZITHROMAX) 250 MG Tab Take 250-500 mg by mouth every day. Pt started a 5 day course on 3/2         Family History  History reviewed. No pertinent family history.    Social History  Social History   Substance Use Topics   • Smoking status: Never Smoker   • Smokeless tobacco: Never Used   • Alcohol use No       Allergies  No Known Allergies     Physical Exam  Laboratory   Hemodynamics  Temp (24hrs), Av.8 °C (98.2 °F), Min:36.8 °C (98.2 °F), Max:36.8 °C (98.2 °F)   Temperature: 36.8 °C (98.2 °F)  Pulse  Av.5  Min: 89  Max: 109 Heart Rate (Monitored): (!) 106  Blood Pressure: 125/80, NIBP: 121/74      Respiratory      Respiration: 16, Pulse Oximetry: 95 %             Physical Exam   Constitutional: He is oriented to person, place, and time. No distress.   HENT:   Head: Normocephalic and atraumatic.   Right Ear: External ear normal.   Left Ear: External ear normal.   Eyes: EOM are normal. Right eye exhibits no discharge. Left eye exhibits no discharge.   Neck: Neck supple. No JVD present.   Cardiovascular: Normal rate, regular rhythm and normal heart sounds.    Pulmonary/Chest: Effort normal and breath  sounds normal. No respiratory distress. He exhibits no tenderness.   Abdominal: Soft. Bowel sounds are normal. He exhibits no distension. There is no tenderness.   Musculoskeletal: He exhibits no edema.   Neurological: He is alert and oriented to person, place, and time. No cranial nerve deficit.   Skin: Skin is dry. He is not diaphoretic. No erythema.   Psychiatric: He has a normal mood and affect. His behavior is normal.   Nursing note and vitals reviewed.    Capillary refill less than 3 seconds, distal pulses intact    Recent Labs      04/27/18   0124   WBC  18.0*   RBC  5.28   HEMOGLOBIN  15.0   HEMATOCRIT  45.6   MCV  86.4   MCH  28.4   MCHC  32.9*   RDW  42.4   PLATELETCT  363   MPV  9.8     Recent Labs      04/27/18   0124   SODIUM  140   POTASSIUM  3.4*   CHLORIDE  107   CO2  24   GLUCOSE  205*   BUN  16   CREATININE  0.98   CALCIUM  8.9     Recent Labs      04/27/18   0124   ALTSGPT  12   ASTSGOT  9*   ALKPHOSPHAT  110*   TBILIRUBIN  0.2   LIPASE  8*   GLUCOSE  205*                 Lab Results   Component Value Date    TROPONINI <0.01 03/15/2018       Imaging  No results found.   Assessment/Plan     Anticipate that patient will need greater than 2 midnights for management of the discussed medical issues.    * Diarrhea   Assessment & Plan    Patient has leukocytosis, his last antibiotic use was in March. He is on a proton pump inhibitor which may increase his risk of C. diff diarrhea. Otherwise, he does not have any recent significant risk factors for C. diff. CT scan is pending and he may have underlying diverticulitis versus inflammatory bowel disease. However in light of his leukocytosis, I will start him empirically on ceftriaxone and Flagyl although I'm holding off on vancomycin for now until we have results from his C. diff study given his relatively little risk factors. If there is no evidence of bacterial infection, this may very well be a severe viral gastroenteritis in which case we will stop all  antibiotic therapy and support him symptomatically only. He'll be on IV fluids for resuscitation.        T2DM (type 2 diabetes mellitus) (CMS-[facility])   Assessment & Plan    This is chronic, blood sugar is mildly elevated which is not surprising in light of his infection. Continue home NPH, monitor with Accu-Cheks and cover with insulin sliding scale.        Essential hypertension- (present on admission)   Assessment & Plan    This is chronic and stable, continue home Prinivil.          Prophylaxis: Sequential compression devices for DVT prophylaxis, no PPI indicated, bowel protocol

## 2018-04-27 NOTE — ED TRIAGE NOTES
"Chief Complaint   Patient presents with   • Nausea/Vomiting/Diarrhea     \"I woke up Wednesday morning with diarrhea and abd pain. I haven't been eating much. Then I started vomiting today.\"   • Abdominal Pain     generalized     Pt amb to triage with above. Unsure of fevers, reports getting diaphoretic.   Reports hx of gastroparesis.   VSS. Pt returned to lobby. Educated on triage process and to inform staff of any changes.     /80   Pulse 98   Temp 36.8 °C (98.2 °F) (Temporal)   Resp 16   Ht 1.905 m (6' 3\")   Wt (!) 138.8 kg (306 lb)   SpO2 94%   BMI 38.25 kg/m²     "

## 2018-04-27 NOTE — PROGRESS NOTES
Report received from ELBA Badillo. Pt is AAOx4, daughter at bedside. Assessment completed. Hyperactive BS x4, RA, c diff test results negative, SCDs in place. Pt denies pain. Pt ambulates independently. Pt educated regarding plan of care, including IVF per MAR, pending test results, andIV ABX. All questions answered. Call light and personal belongings in reach. No additional needs at this time.

## 2018-04-27 NOTE — ASSESSMENT & PLAN NOTE
Patient has leukocytosis, his last antibiotic use was in March. He is on a proton pump inhibitor which may increase his risk of C. diff diarrhea. Otherwise, he does not have any recent significant risk factors for C. diff. CT scan is pending and he may have underlying diverticulitis versus inflammatory bowel disease. However in light of his leukocytosis, I will start him empirically on ceftriaxone and Flagyl although I'm holding off on vancomycin for now until we have results from his C. diff study given his relatively little risk factors. If there is no evidence of bacterial infection, this may very well be a severe viral gastroenteritis in which case we will stop all antibiotic therapy and support him symptomatically only. He'll be on IV fluids for resuscitation.

## 2018-04-27 NOTE — ED PROVIDER NOTES
"ED Provider Note    Scribed for Yadiel Rebollar M.D. by Frances Cheng. 4/27/2018  12:40 AM    Means of arrival: Walk-in  History obtained by: Patient  Limitations: None      CHIEF COMPLAINT  Chief Complaint   Patient presents with   • Nausea/Vomiting/Diarrhea     \"I woke up Wednesday morning with diarrhea and abd pain. I haven't been eating much. Then I started vomiting today.\"   • Abdominal Pain     generalized       HPI  Dakota Cisneros is a 57 y.o. male who presents with nausea/vomiting onset today. The patient reports he woke up a week ago with diarrhea and generalized abdominal pain. He confirms approximately 15 episodes of diarrhea tonight. He describes his abdominal pain as \"achy\" in nature, pain is diffuse and nonfocal.. Patient states eating exacerbates his symptoms. The patient confirms a past history of Gastroparesis and states his symptoms feels similar. He denies hematochezia and tarry stools.    The patient reports he was at [facility] last month secondary to his DKA.      REVIEW OF SYSTEMS  Review of Systems   Gastrointestinal: Positive for abdominal pain, diarrhea, nausea and vomiting. Negative for melena.        Negative for hematochezia.    All other systems reviewed and are negative.    See HPI for further details. C.    PAST MEDICAL HISTORY  The patient has a past medical history of Gastroparesis.    SOCIAL HISTORY  Social History     Social History Main Topics   • Smoking status: Never Smoker   • Smokeless tobacco: Never Used   • Alcohol use Not on file   • Drug use: Unknown       SURGICAL HISTORY  patient denies any surgical history    CURRENT MEDICATIONS  Home Medications    **Home medications have not yet been reviewed for this encounter**         ALLERGIES  No Known Allergies    PHYSICAL EXAM  /80   Pulse 89   Temp 36.8 °C (98.2 °F) (Temporal)   Resp 16   Ht 1.905 m (6' 3\")   Wt (!) 138.8 kg (306 lb)   SpO2 95%   BMI 38.25 kg/m²   Constitutional: Well developed, Well nourished, No " acute distress, Non-toxic appearance.   HENT: Normocephalic, Atraumatic. Dry mucous membranes.  Eyes: PERRL, EOM intact  Neck: Supple, no meningismus  Lymphatic: No lymphadenopathy noted.   Cardiovascular: Regular rate and rhythm  Lungs: Clear to auscultation bilaterally, easy unlabored respirations   Abdomen: Bowel sounds normal, Soft, No tenderness  Skin: Warm, Dry, no rash  Back: No tenderness, No CVA tenderness.   Extremities: No edema to lower extremities  Neurologic: Alert and oriented, appropriate, follows commands, moving all extremities, normal speech   Psychiatric: Affect normal      DIAGNOSTIC STUDIES / PROCEDURES    LABS  Results for orders placed or performed during the hospital encounter of 03/15/18   CBC WITH DIFFERENTIAL   Result Value Ref Range    WBC 15.1 (H) 4.8 - 10.8 K/uL    RBC 5.72 4.70 - 6.10 M/uL    Hemoglobin 16.6 14.0 - 18.0 g/dL    Hematocrit 48.3 42.0 - 52.0 %    MCV 84.4 81.4 - 97.8 fL    MCH 29.0 27.0 - 33.0 pg    MCHC 34.4 33.7 - 35.3 g/dL    RDW 39.1 35.9 - 50.0 fL    Platelet Count 394 164 - 446 K/uL    MPV 10.4 9.0 - 12.9 fL    Neutrophils-Polys 68.20 44.00 - 72.00 %    Lymphocytes 24.60 22.00 - 41.00 %    Monocytes 5.80 0.00 - 13.40 %    Eosinophils 0.30 0.00 - 6.90 %    Basophils 0.50 0.00 - 1.80 %    Immature Granulocytes 0.60 0.00 - 0.90 %    Nucleated RBC 0.00 /100 WBC    Neutrophils (Absolute) 10.33 (H) 1.82 - 7.42 K/uL    Lymphs (Absolute) 3.72 1.00 - 4.80 K/uL    Monos (Absolute) 0.87 (H) 0.00 - 0.85 K/uL    Eos (Absolute) 0.05 0.00 - 0.51 K/uL    Baso (Absolute) 0.07 0.00 - 0.12 K/uL    Immature Granulocytes (abs) 0.09 0.00 - 0.11 K/uL    NRBC (Absolute) 0.00 K/uL   CMP   Result Value Ref Range    Sodium 128 (L) 135 - 145 mmol/L    Potassium 4.0 3.6 - 5.5 mmol/L    Chloride 99 96 - 112 mmol/L    Co2 9 (LL) 20 - 33 mmol/L    Anion Gap 20.0 (H) 0.0 - 11.9    Glucose 341 (H) 65 - 99 mg/dL    Bun 17 8 - 22 mg/dL    Creatinine 0.99 0.50 - 1.40 mg/dL    Calcium 10.2 8.5 - 10.5  mg/dL    AST(SGOT) 11 (L) 12 - 45 U/L    ALT(SGPT) 19 2 - 50 U/L    Alkaline Phosphatase 142 (H) 30 - 99 U/L    Total Bilirubin 0.5 0.1 - 1.5 mg/dL    Albumin 4.4 3.2 - 4.9 g/dL    Total Protein 7.2 6.0 - 8.2 g/dL    Globulin 2.8 1.9 - 3.5 g/dL    A-G Ratio 1.6 g/dL   TROPONIN   Result Value Ref Range    Troponin I <0.01 0.00 - 0.04 ng/mL   BNP   Result Value Ref Range    B Natriuretic Peptide <2 0 - 100 pg/mL   ESTIMATED GFR   Result Value Ref Range    GFR If African American >60 >60 mL/min/1.73 m 2    GFR If Non African American >60 >60 mL/min/1.73 m 2   BETA-HYDROXYBUTYRIC ACID   Result Value Ref Range    beta-Hydroxybutyric Acid 5.09 (H) 0.02 - 0.27 mmol/L   CBC with Differential   Result Value Ref Range    WBC 13.8 (H) 4.8 - 10.8 K/uL    RBC 5.29 4.70 - 6.10 M/uL    Hemoglobin 14.9 14.0 - 18.0 g/dL    Hematocrit 44.2 42.0 - 52.0 %    MCV 83.6 81.4 - 97.8 fL    MCH 28.2 27.0 - 33.0 pg    MCHC 33.7 33.7 - 35.3 g/dL    RDW 39.1 35.9 - 50.0 fL    Platelet Count 363 164 - 446 K/uL    MPV 10.0 9.0 - 12.9 fL    Neutrophils-Polys 59.00 44.00 - 72.00 %    Lymphocytes 30.60 22.00 - 41.00 %    Monocytes 8.50 0.00 - 13.40 %    Eosinophils 0.90 0.00 - 6.90 %    Basophils 0.50 0.00 - 1.80 %    Immature Granulocytes 0.50 0.00 - 0.90 %    Nucleated RBC 0.00 /100 WBC    Neutrophils (Absolute) 8.15 (H) 1.82 - 7.42 K/uL    Lymphs (Absolute) 4.23 1.00 - 4.80 K/uL    Monos (Absolute) 1.17 (H) 0.00 - 0.85 K/uL    Eos (Absolute) 0.12 0.00 - 0.51 K/uL    Baso (Absolute) 0.07 0.00 - 0.12 K/uL    Immature Granulocytes (abs) 0.07 0.00 - 0.11 K/uL    NRBC (Absolute) 0.00 K/uL   Comp Metabolic Panel with Phosphorus, Magnesium   Result Value Ref Range    Sodium 131 (L) 135 - 145 mmol/L    Potassium 3.7 3.6 - 5.5 mmol/L    Chloride 104 96 - 112 mmol/L    Co2 12 (L) 20 - 33 mmol/L    Anion Gap 15.0 (H) 0.0 - 11.9    Glucose 216 (H) 65 - 99 mg/dL    Bun 16 8 - 22 mg/dL    Creatinine 0.95 0.50 - 1.40 mg/dL    Calcium 9.8 8.5 - 10.5 mg/dL     AST(SGOT) 9 (L) 12 - 45 U/L    ALT(SGPT) 14 2 - 50 U/L    Alkaline Phosphatase 111 (H) 30 - 99 U/L    Total Bilirubin 0.5 0.1 - 1.5 mg/dL    Albumin 3.8 3.2 - 4.9 g/dL    Total Protein 6.5 6.0 - 8.2 g/dL    Globulin 2.7 1.9 - 3.5 g/dL    A-G Ratio 1.4 g/dL   Troponin - STAT Once   Result Value Ref Range    Troponin I <0.01 0.00 - 0.04 ng/mL   Hemoglobin A1c   Result Value Ref Range    Glycohemoglobin 13.3 (H) 0.0 - 5.6 %    Est Avg Glucose 335 mg/dL   TSH (Thyroid Stimulating Hormone)   Result Value Ref Range    TSH 1.460 0.380 - 5.330 uIU/mL   ESTIMATED GFR   Result Value Ref Range    GFR If African American >60 >60 mL/min/1.73 m 2    GFR If Non African American >60 >60 mL/min/1.73 m 2   MAGNESIUM   Result Value Ref Range    Magnesium 1.6 1.5 - 2.5 mg/dL   PHOSPHORUS   Result Value Ref Range    Phosphorus 1.7 (L) 2.5 - 4.5 mg/dL   Basic Metabolic Panel (BMP)   Result Value Ref Range    Sodium 133 (L) 135 - 145 mmol/L    Potassium 3.6 3.6 - 5.5 mmol/L    Chloride 106 96 - 112 mmol/L    Co2 14 (L) 20 - 33 mmol/L    Glucose 123 (H) 65 - 99 mg/dL    Bun 16 8 - 22 mg/dL    Creatinine 0.91 0.50 - 1.40 mg/dL    Calcium 9.5 8.5 - 10.5 mg/dL    Anion Gap 13.0 (H) 0.0 - 11.9   Basic Metabolic Panel (BMP)   Result Value Ref Range    Sodium 132 (L) 135 - 145 mmol/L    Potassium 3.7 3.6 - 5.5 mmol/L    Chloride 105 96 - 112 mmol/L    Co2 16 (L) 20 - 33 mmol/L    Glucose 142 (H) 65 - 99 mg/dL    Bun 15 8 - 22 mg/dL    Creatinine 0.88 0.50 - 1.40 mg/dL    Calcium 9.2 8.5 - 10.5 mg/dL    Anion Gap 11.0 0.0 - 11.9   TROPONIN   Result Value Ref Range    Troponin I <0.01 0.00 - 0.04 ng/mL   ESTIMATED GFR   Result Value Ref Range    GFR If African American >60 >60 mL/min/1.73 m 2    GFR If Non African American >60 >60 mL/min/1.73 m 2   CBC with Differential   Result Value Ref Range    WBC 10.8 4.8 - 10.8 K/uL    RBC 5.01 4.70 - 6.10 M/uL    Hemoglobin 14.6 14.0 - 18.0 g/dL    Hematocrit 42.1 42.0 - 52.0 %    MCV 84.0 81.4 - 97.8 fL     MCH 29.1 27.0 - 33.0 pg    MCHC 34.7 33.7 - 35.3 g/dL    RDW 39.9 35.9 - 50.0 fL    Platelet Count 329 164 - 446 K/uL    MPV 10.2 9.0 - 12.9 fL    Neutrophils-Polys 61.40 44.00 - 72.00 %    Lymphocytes 26.60 22.00 - 41.00 %    Monocytes 9.00 0.00 - 13.40 %    Eosinophils 1.90 0.00 - 6.90 %    Basophils 0.60 0.00 - 1.80 %    Immature Granulocytes 0.50 0.00 - 0.90 %    Nucleated RBC 0.00 /100 WBC    Neutrophils (Absolute) 6.62 1.82 - 7.42 K/uL    Lymphs (Absolute) 2.87 1.00 - 4.80 K/uL    Monos (Absolute) 0.97 (H) 0.00 - 0.85 K/uL    Eos (Absolute) 0.20 0.00 - 0.51 K/uL    Baso (Absolute) 0.06 0.00 - 0.12 K/uL    Immature Granulocytes (abs) 0.05 0.00 - 0.11 K/uL    NRBC (Absolute) 0.00 K/uL   Lipid Profile (Lipid Panel) Fasting   Result Value Ref Range    Cholesterol,Tot 131 100 - 199 mg/dL    Triglycerides 172 (H) 0 - 149 mg/dL    HDL 23 (A) >=40 mg/dL    LDL 74 <100 mg/dL   Magnesium   Result Value Ref Range    Magnesium 2.0 1.5 - 2.5 mg/dL   Basic Metabolic Panel (BMP)   Result Value Ref Range    Sodium 133 (L) 135 - 145 mmol/L    Potassium 3.4 (L) 3.6 - 5.5 mmol/L    Chloride 106 96 - 112 mmol/L    Co2 17 (L) 20 - 33 mmol/L    Glucose 136 (H) 65 - 99 mg/dL    Bun 16 8 - 22 mg/dL    Creatinine 0.85 0.50 - 1.40 mg/dL    Calcium 9.4 8.5 - 10.5 mg/dL    Anion Gap 10.0 0.0 - 11.9   ESTIMATED GFR   Result Value Ref Range    GFR If African American >60 >60 mL/min/1.73 m 2    GFR If Non African American >60 >60 mL/min/1.73 m 2   Basic Metabolic Panel (BMP)   Result Value Ref Range    Sodium 133 (L) 135 - 145 mmol/L    Potassium 3.6 3.6 - 5.5 mmol/L    Chloride 105 96 - 112 mmol/L    Co2 17 (L) 20 - 33 mmol/L    Glucose 143 (H) 65 - 99 mg/dL    Bun 15 8 - 22 mg/dL    Creatinine 0.81 0.50 - 1.40 mg/dL    Calcium 9.6 8.5 - 10.5 mg/dL    Anion Gap 11.0 0.0 - 11.9   ESTIMATED GFR   Result Value Ref Range    GFR If African American >60 >60 mL/min/1.73 m 2    GFR If Non African American >60 >60 mL/min/1.73 m 2   PHOSPHORUS    Result Value Ref Range    Phosphorus 2.4 (L) 2.5 - 4.5 mg/dL   MAGNESIUM   Result Value Ref Range    Magnesium 1.8 1.5 - 2.5 mg/dL   ESTIMATED GFR   Result Value Ref Range    GFR If African American >60 >60 mL/min/1.73 m 2    GFR If Non African American >60 >60 mL/min/1.73 m 2   Basic Metabolic Panel (BMP)   Result Value Ref Range    Sodium 132 (L) 135 - 145 mmol/L    Potassium 3.7 3.6 - 5.5 mmol/L    Chloride 104 96 - 112 mmol/L    Co2 17 (L) 20 - 33 mmol/L    Glucose 260 (H) 65 - 99 mg/dL    Bun 13 8 - 22 mg/dL    Creatinine 0.75 0.50 - 1.40 mg/dL    Calcium 9.7 8.5 - 10.5 mg/dL    Anion Gap 11.0 0.0 - 11.9   MAGNESIUM   Result Value Ref Range    Magnesium 1.6 1.5 - 2.5 mg/dL   PHOSPHORUS   Result Value Ref Range    Phosphorus 2.3 (L) 2.5 - 4.5 mg/dL   ESTIMATED GFR   Result Value Ref Range    GFR If African American >60 >60 mL/min/1.73 m 2    GFR If Non African American >60 >60 mL/min/1.73 m 2   CBC WITH DIFFERENTIAL   Result Value Ref Range    WBC 8.6 4.8 - 10.8 K/uL    RBC 5.02 4.70 - 6.10 M/uL    Hemoglobin 14.4 14.0 - 18.0 g/dL    Hematocrit 42.1 42.0 - 52.0 %    MCV 83.9 81.4 - 97.8 fL    MCH 28.7 27.0 - 33.0 pg    MCHC 34.2 33.7 - 35.3 g/dL    RDW 41.1 35.9 - 50.0 fL    Platelet Count 325 164 - 446 K/uL    MPV 10.5 9.0 - 12.9 fL    Neutrophils-Polys 50.60 44.00 - 72.00 %    Lymphocytes 36.70 22.00 - 41.00 %    Monocytes 9.70 0.00 - 13.40 %    Eosinophils 1.70 0.00 - 6.90 %    Basophils 0.80 0.00 - 1.80 %    Immature Granulocytes 0.50 0.00 - 0.90 %    Nucleated RBC 0.00 /100 WBC    Neutrophils (Absolute) 4.36 1.82 - 7.42 K/uL    Lymphs (Absolute) 3.17 1.00 - 4.80 K/uL    Monos (Absolute) 0.84 0.00 - 0.85 K/uL    Eos (Absolute) 0.15 0.00 - 0.51 K/uL    Baso (Absolute) 0.07 0.00 - 0.12 K/uL    Immature Granulocytes (abs) 0.04 0.00 - 0.11 K/uL    NRBC (Absolute) 0.00 K/uL   CBC WITH DIFFERENTIAL   Result Value Ref Range    WBC 7.7 4.8 - 10.8 K/uL    RBC 5.15 4.70 - 6.10 M/uL    Hemoglobin 14.5 14.0 - 18.0  g/dL    Hematocrit 43.6 42.0 - 52.0 %    MCV 84.7 81.4 - 97.8 fL    MCH 28.2 27.0 - 33.0 pg    MCHC 33.3 (L) 33.7 - 35.3 g/dL    RDW 41.4 35.9 - 50.0 fL    Platelet Count 330 164 - 446 K/uL    MPV 10.2 9.0 - 12.9 fL    Neutrophils-Polys 47.20 44.00 - 72.00 %    Lymphocytes 40.20 22.00 - 41.00 %    Monocytes 8.50 0.00 - 13.40 %    Eosinophils 2.90 0.00 - 6.90 %    Basophils 0.80 0.00 - 1.80 %    Immature Granulocytes 0.40 0.00 - 0.90 %    Nucleated RBC 0.00 /100 WBC    Neutrophils (Absolute) 3.63 1.82 - 7.42 K/uL    Lymphs (Absolute) 3.08 1.00 - 4.80 K/uL    Monos (Absolute) 0.65 0.00 - 0.85 K/uL    Eos (Absolute) 0.22 0.00 - 0.51 K/uL    Baso (Absolute) 0.06 0.00 - 0.12 K/uL    Immature Granulocytes (abs) 0.03 0.00 - 0.11 K/uL    NRBC (Absolute) 0.00 K/uL   CBC WITH DIFFERENTIAL   Result Value Ref Range    WBC 6.9 4.8 - 10.8 K/uL    RBC 4.87 4.70 - 6.10 M/uL    Hemoglobin 14.1 14.0 - 18.0 g/dL    Hematocrit 41.1 (L) 42.0 - 52.0 %    MCV 84.4 81.4 - 97.8 fL    MCH 29.0 27.0 - 33.0 pg    MCHC 34.3 33.7 - 35.3 g/dL    RDW 40.6 35.9 - 50.0 fL    Platelet Count 298 164 - 446 K/uL    MPV 10.2 9.0 - 12.9 fL    Neutrophils-Polys 49.90 44.00 - 72.00 %    Lymphocytes 37.00 22.00 - 41.00 %    Monocytes 9.80 0.00 - 13.40 %    Eosinophils 2.30 0.00 - 6.90 %    Basophils 0.70 0.00 - 1.80 %    Immature Granulocytes 0.30 0.00 - 0.90 %    Nucleated RBC 0.00 /100 WBC    Neutrophils (Absolute) 3.45 1.82 - 7.42 K/uL    Lymphs (Absolute) 2.56 1.00 - 4.80 K/uL    Monos (Absolute) 0.68 0.00 - 0.85 K/uL    Eos (Absolute) 0.16 0.00 - 0.51 K/uL    Baso (Absolute) 0.05 0.00 - 0.12 K/uL    Immature Granulocytes (abs) 0.02 0.00 - 0.11 K/uL    NRBC (Absolute) 0.00 K/uL   BASIC METABOLIC PANEL   Result Value Ref Range    Sodium 133 (L) 135 - 145 mmol/L    Potassium 3.3 (L) 3.6 - 5.5 mmol/L    Chloride 99 96 - 112 mmol/L    Co2 24 20 - 33 mmol/L    Glucose 257 (H) 65 - 99 mg/dL    Bun 13 8 - 22 mg/dL    Creatinine 0.80 0.50 - 1.40 mg/dL    Calcium  9.3 8.5 - 10.5 mg/dL    Anion Gap 10.0 0.0 - 11.9   ESTIMATED GFR   Result Value Ref Range    GFR If African American >60 >60 mL/min/1.73 m 2    GFR If Non African American >60 >60 mL/min/1.73 m 2   ACCU-CHEK GLUCOSE   Result Value Ref Range    Glucose - Accu-Ck 264 (H) 65 - 99 mg/dL   ACCU-CHEK GLUCOSE   Result Value Ref Range    Glucose - Accu-Ck 238 (H) 65 - 99 mg/dL   ACCU-CHEK GLUCOSE   Result Value Ref Range    Glucose - Accu-Ck 214 (H) 65 - 99 mg/dL   ACCU-CHEK GLUCOSE   Result Value Ref Range    Glucose - Accu-Ck 180 (H) 65 - 99 mg/dL   ACCU-CHEK GLUCOSE   Result Value Ref Range    Glucose - Accu-Ck 154 (H) 65 - 99 mg/dL   ACCU-CHEK GLUCOSE   Result Value Ref Range    Glucose - Accu-Ck 133 (H) 65 - 99 mg/dL   ACCU-CHEK GLUCOSE   Result Value Ref Range    Glucose - Accu-Ck 122 (H) 65 - 99 mg/dL   ACCU-CHEK GLUCOSE   Result Value Ref Range    Glucose - Accu-Ck 145 (H) 65 - 99 mg/dL   ACCU-CHEK GLUCOSE   Result Value Ref Range    Glucose - Accu-Ck 134 (H) 65 - 99 mg/dL   ACCU-CHEK GLUCOSE   Result Value Ref Range    Glucose - Accu-Ck 133 (H) 65 - 99 mg/dL   ACCU-CHEK GLUCOSE   Result Value Ref Range    Glucose - Accu-Ck 132 (H) 65 - 99 mg/dL   ACCU-CHEK GLUCOSE   Result Value Ref Range    Glucose - Accu-Ck 136 (H) 65 - 99 mg/dL   ACCU-CHEK GLUCOSE   Result Value Ref Range    Glucose - Accu-Ck 124 (H) 65 - 99 mg/dL   ACCU-CHEK GLUCOSE   Result Value Ref Range    Glucose - Accu-Ck 146 (H) 65 - 99 mg/dL   ACCU-CHEK GLUCOSE   Result Value Ref Range    Glucose - Accu-Ck 127 (H) 65 - 99 mg/dL   ACCU-CHEK GLUCOSE   Result Value Ref Range    Glucose - Accu-Ck 127 (H) 65 - 99 mg/dL   ACCU-CHEK GLUCOSE   Result Value Ref Range    Glucose - Accu-Ck 161 (H) 65 - 99 mg/dL   ACCU-CHEK GLUCOSE   Result Value Ref Range    Glucose - Accu-Ck 140 (H) 65 - 99 mg/dL   ACCU-CHEK GLUCOSE   Result Value Ref Range    Glucose - Accu-Ck 127 (H) 65 - 99 mg/dL   ACCU-CHEK GLUCOSE   Result Value Ref Range    Glucose - Accu-Ck 127 (H) 65 -  99 mg/dL   ACCU-CHEK GLUCOSE   Result Value Ref Range    Glucose - Accu-Ck 124 (H) 65 - 99 mg/dL   ACCU-CHEK GLUCOSE   Result Value Ref Range    Glucose - Accu-Ck 186 (H) 65 - 99 mg/dL   ACCU-CHEK GLUCOSE   Result Value Ref Range    Glucose - Accu-Ck 220 (H) 65 - 99 mg/dL   ACCU-CHEK GLUCOSE   Result Value Ref Range    Glucose - Accu-Ck 225 (H) 65 - 99 mg/dL   ACCU-CHEK GLUCOSE   Result Value Ref Range    Glucose - Accu-Ck 319 (H) 65 - 99 mg/dL   ACCU-CHEK GLUCOSE   Result Value Ref Range    Glucose - Accu-Ck 241 (H) 65 - 99 mg/dL   ACCU-CHEK GLUCOSE   Result Value Ref Range    Glucose - Accu-Ck 342 (H) 65 - 99 mg/dL   ACCU-CHEK GLUCOSE   Result Value Ref Range    Glucose - Accu-Ck 323 (H) 65 - 99 mg/dL   ACCU-CHEK GLUCOSE   Result Value Ref Range    Glucose - Accu-Ck 240 (H) 65 - 99 mg/dL   ACCU-CHEK GLUCOSE   Result Value Ref Range    Glucose - Accu-Ck 234 (H) 65 - 99 mg/dL   ACCU-CHEK GLUCOSE   Result Value Ref Range    Glucose - Accu-Ck 239 (H) 65 - 99 mg/dL   ACCU-CHEK GLUCOSE   Result Value Ref Range    Glucose - Accu-Ck 277 (H) 65 - 99 mg/dL   ACCU-CHEK GLUCOSE   Result Value Ref Range    Glucose - Accu-Ck 322 (H) 65 - 99 mg/dL   ACCU-CHEK GLUCOSE   Result Value Ref Range    Glucose - Accu-Ck 249 (H) 65 - 99 mg/dL   ACCU-CHEK GLUCOSE   Result Value Ref Range    Glucose - Accu-Ck 216 (H) 65 - 99 mg/dL   EKG (ER)   Result Value Ref Range    Report       [facility]    Test Date:  2018-03-15  Pt Name:    CAROLYN CRAIG                 Department: ER  MRN:        [id number]                      Room:  Gender:     M                            Technician: [id number]  :        1960                   Requested By:ER TRIAGE PROTOCOL  Order #:    [id number]                    Reading MD:    Measurements  Intervals                                Axis  Rate:       107                          P:          0  KY:         150                          QRS:        30  QRSD:       100                           T:          -73  QT:         305  QTc:        407    Interpretive Statements  SINUS TACHYCARDIA  EARLY PRECORDIAL R/S TRANSITION  NONSPECIFIC T ABNORMALITIES, INFERIOR LEADS  BASELINE WANDER IN LEAD(S) V1  No previous ECG available for comparison     EKG - STAT Once   Result Value Ref Range    Report       [facility]    Test Date:  2018-03-15  Pt Name:    CAROLYN CRAIG                 Department: ER  MRN:        [id number]                      Room:       [facility]  Gender:     Male                         Technician: [id number]  :        1960                   Requested By:JONATHAN NAVARRO  Order #:    [id number]                    Reading MD: Grisel Boss MD    Measurements  Intervals                                Axis  Rate:       89                           P:          49  CT:         244                          QRS:        6  QRSD:       96                           T:          -20  QT:         344  QTc:        419    Interpretive Statements  SINUS RHYTHM  FIRST DEGREE AV BLOCK  EARLY PRECORDIAL R/S TRANSITION  BORDERLINE T ABNORMALITIES, INFERIOR LEADS  Compared to ECG 03/15/2018 09:54:24  First degree AV block now present  Sinus tachycardia no longer present  T-wave abnormality still present    Electronically Signed On 3- 7:49:19 PDT by Grisel Boss MD       COURSE & MEDICAL DECISION MAKING  Pertinent Labs & Imaging studies reviewed. (See chart for details)    12:40 AM Patient seen and examined at bedside. The patient presents with symptoms concerning for possible C. diff versus infectious diarrhea otherwise. Patient with no focality on his abdominal exam to suggest a surgical infection but I will check basic labs and if patient was considerable leukocytosis or do believe they warrant a CT. Ordered for CBC with Differential, CMP, Lipase to evaluate. Patient will be treated with Reglan 10 mg, Bentyl 20 mg, and NS Infusion 1,000 mL for his dry mucous membranes.    Patient  with considerable leukocytosis. Giving IV fluids for patient's tachycardia and likely dehydration. Giving dental patient's crampy abdominal pain and Zofran.  Tachycardia improved following fluids.  Will CT scan for possible surgical pathology given patient's leukocytosis though I believe this is likely indicative of his c. diff. He'll be admitted for ongoing management and workup.        FINAL IMPRESSION  1. Infectious diarrhea, leukocytosis      Frances DOHERTY (Scribe), am scribing for, and in the presence of, Yadiel Rebollar M.D..    Electronically signed by: Frances Cheng (Scribe), 4/27/2018    IYadiel M.D. personally performed the services described in this documentation, as scribed by Frances Cheng in my presence, and it is both accurate and complete.    The note accurately reflects work and decisions made by me.  Yadiel Rebollar  4/27/2018  4:10 AM

## 2018-04-27 NOTE — ED NOTES
The Medication Reconciliation process has been completed by interviewing the patient    Allergies have been reviewed  Antibiotic use in 30 days - none    Home Pharmacy:  [facility]

## 2018-04-27 NOTE — ASSESSMENT & PLAN NOTE
This is chronic, blood sugar is mildly elevated which is not surprising in light of his infection. Continue home NPH, monitor with Accu-Cheks and cover with insulin sliding scale.

## 2018-04-28 VITALS
OXYGEN SATURATION: 94 % | HEART RATE: 60 BPM | DIASTOLIC BLOOD PRESSURE: 41 MMHG | TEMPERATURE: 98.6 F | WEIGHT: 306 LBS | HEIGHT: 75 IN | BODY MASS INDEX: 38.05 KG/M2 | RESPIRATION RATE: 16 BRPM | SYSTOLIC BLOOD PRESSURE: 131 MMHG

## 2018-04-28 PROBLEM — K52.9 GASTROENTERITIS: Status: ACTIVE | Noted: 2018-04-27

## 2018-04-28 PROBLEM — E11.43 DIABETIC GASTROPARESIS (HCC): Status: ACTIVE | Noted: 2018-04-28

## 2018-04-28 PROBLEM — K31.84 DIABETIC GASTROPARESIS (HCC): Status: ACTIVE | Noted: 2018-04-28

## 2018-04-28 LAB
ANION GAP SERPL CALC-SCNC: 11 MMOL/L (ref 0–11.9)
BUN SERPL-MCNC: 11 MG/DL (ref 8–22)
CALCIUM SERPL-MCNC: 8 MG/DL (ref 8.5–10.5)
CHLORIDE SERPL-SCNC: 110 MMOL/L (ref 96–112)
CO2 SERPL-SCNC: 22 MMOL/L (ref 20–33)
CREAT SERPL-MCNC: 0.81 MG/DL (ref 0.5–1.4)
ERYTHROCYTE [DISTWIDTH] IN BLOOD BY AUTOMATED COUNT: 43.7 FL (ref 35.9–50)
GLUCOSE SERPL-MCNC: 117 MG/DL (ref 65–99)
HCT VFR BLD AUTO: 41.2 % (ref 42–52)
HGB BLD-MCNC: 13.5 G/DL (ref 14–18)
MCH RBC QN AUTO: 28.4 PG (ref 27–33)
MCHC RBC AUTO-ENTMCNC: 32.8 G/DL (ref 33.7–35.3)
MCV RBC AUTO: 86.7 FL (ref 81.4–97.8)
PLATELET # BLD AUTO: 303 K/UL (ref 164–446)
PMV BLD AUTO: 9.8 FL (ref 9–12.9)
POTASSIUM SERPL-SCNC: 3.2 MMOL/L (ref 3.6–5.5)
RBC # BLD AUTO: 4.75 M/UL (ref 4.7–6.1)
SODIUM SERPL-SCNC: 143 MMOL/L (ref 135–145)
WBC # BLD AUTO: 14.4 K/UL (ref 4.8–10.8)

## 2018-04-28 PROCEDURE — 85027 COMPLETE CBC AUTOMATED: CPT

## 2018-04-28 PROCEDURE — 700102 HCHG RX REV CODE 250 W/ 637 OVERRIDE(OP): Performed by: HOSPITALIST

## 2018-04-28 PROCEDURE — 80048 BASIC METABOLIC PNL TOTAL CA: CPT

## 2018-04-28 PROCEDURE — A9270 NON-COVERED ITEM OR SERVICE: HCPCS | Performed by: HOSPITALIST

## 2018-04-28 PROCEDURE — 36415 COLL VENOUS BLD VENIPUNCTURE: CPT

## 2018-04-28 PROCEDURE — 87040 BLOOD CULTURE FOR BACTERIA: CPT | Mod: 91

## 2018-04-28 PROCEDURE — 700101 HCHG RX REV CODE 250: Performed by: HOSPITALIST

## 2018-04-28 PROCEDURE — 700111 HCHG RX REV CODE 636 W/ 250 OVERRIDE (IP): Performed by: HOSPITALIST

## 2018-04-28 RX ORDER — METRONIDAZOLE 500 MG/1
500 TABLET ORAL EVERY 8 HOURS
Qty: 24 TAB | Refills: 0 | Status: SHIPPED | OUTPATIENT
Start: 2018-04-28 | End: 2018-05-06

## 2018-04-28 RX ORDER — NICOTINE 14MG/24HR
1 PATCH, TRANSDERMAL 24 HOURS TRANSDERMAL 2 TIMES DAILY WITH MEALS
Qty: 16 CAP | Refills: 0 | Status: SHIPPED | OUTPATIENT
Start: 2018-04-28 | End: 2018-05-06

## 2018-04-28 RX ORDER — CEFUROXIME AXETIL 500 MG/1
500 TABLET ORAL 2 TIMES DAILY
Qty: 16 TAB | Refills: 0 | Status: SHIPPED | OUTPATIENT
Start: 2018-04-28 | End: 2018-05-06

## 2018-04-28 RX ADMIN — OXYCODONE HYDROCHLORIDE 40 MG: 40 TABLET, FILM COATED, EXTENDED RELEASE ORAL at 14:22

## 2018-04-28 RX ADMIN — INSULIN HUMAN 15 UNITS: 100 INJECTION, SUSPENSION SUBCUTANEOUS at 06:14

## 2018-04-28 RX ADMIN — METRONIDAZOLE 500 MG: 500 INJECTION, SOLUTION INTRAVENOUS at 06:13

## 2018-04-28 RX ADMIN — OXYCODONE HYDROCHLORIDE 40 MG: 40 TABLET, FILM COATED, EXTENDED RELEASE ORAL at 06:13

## 2018-04-28 RX ADMIN — POTASSIUM CHLORIDE AND SODIUM CHLORIDE: 900; 150 INJECTION, SOLUTION INTRAVENOUS at 00:05

## 2018-04-28 RX ADMIN — CEFTRIAXONE 2 G: 2 INJECTION, POWDER, FOR SOLUTION INTRAMUSCULAR; INTRAVENOUS at 09:54

## 2018-04-28 ASSESSMENT — COGNITIVE AND FUNCTIONAL STATUS - GENERAL
WALKING IN HOSPITAL ROOM: A LITTLE
MOBILITY SCORE: 18
CLIMB 3 TO 5 STEPS WITH RAILING: A LITTLE
MOVING FROM LYING ON BACK TO SITTING ON SIDE OF FLAT BED: A LITTLE
TOILETING: A LITTLE
SUGGESTED CMS G CODE MODIFIER MOBILITY: CK
DAILY ACTIVITIY SCORE: 19
PERSONAL GROOMING: A LITTLE
HELP NEEDED FOR BATHING: A LITTLE
MOVING TO AND FROM BED TO CHAIR: A LITTLE
DRESSING REGULAR UPPER BODY CLOTHING: A LITTLE
DRESSING REGULAR LOWER BODY CLOTHING: A LITTLE
STANDING UP FROM CHAIR USING ARMS: A LITTLE
SUGGESTED CMS G CODE MODIFIER DAILY ACTIVITY: CK
TURNING FROM BACK TO SIDE WHILE IN FLAT BAD: A LITTLE

## 2018-04-28 ASSESSMENT — PAIN SCALES - GENERAL
PAINLEVEL_OUTOF10: 0
PAINLEVEL_OUTOF10: 0

## 2018-04-28 NOTE — CARE PLAN
Problem: Safety  Goal: Will remain free from injury  Outcome: PROGRESSING AS EXPECTED  Patient calls appropriately, bed in low position, all needs met, no injury

## 2018-04-28 NOTE — PROGRESS NOTES
Patient discharged home with daughter at this time. A&ox4. VSS. IV taken out. Discharge instructions gone over with patient and prescriptions sent to pharmacy. Safety precautions maintained. Patient discharged at this time.

## 2018-04-28 NOTE — PROGRESS NOTES
I saw Dakota Cisneros  Less nauseous and tolerated meals today  He admits to diabetic gastroparesis  He likely has a mild viral gastritis and gastroparesis exacerbation  He does follow GI in the outpatient  If he tolerates food tomorrow and has no more symptoms he can be discharged. If his symptoms worsen or he can;t tolerate food, we willhave a GI consult.

## 2018-04-28 NOTE — DISCHARGE INSTRUCTIONS
Discharge Instructions    Discharged to home by car with relative. Discharged via wheelchair, hospital escort: Refused.  Special equipment needed: Not Applicable    Be sure to schedule a follow-up appointment with your primary care doctor or any specialists as instructed.     Discharge Plan:   Diet Plan: Discussed  Activity Level: Discussed  Confirmed Follow up Appointment: Patient to Call and Schedule Appointment  Confirmed Symptoms Management: Discussed  Medication Reconciliation Updated: Yes  Influenza Vaccine Indication: Patient Refuses    I understand that a diet low in cholesterol, fat, and sodium is recommended for good health. Unless I have been given specific instructions below for another diet, I accept this instruction as my diet prescription.   Other diet: Diabetic Diet      Special Instructions: None    · Is patient discharged on Warfarin / Coumadin?   No     Depression / Suicide Risk    As you are discharged from this [facility] facility, it is important to learn how to keep safe from harming yourself.    Recognize the warning signs:  · Abrupt changes in personality, positive or negative- including increase in energy   · Giving away possessions  · Change in eating patterns- significant weight changes-  positive or negative  · Change in sleeping patterns- unable to sleep or sleeping all the time   · Unwillingness or inability to communicate  · Depression  · Unusual sadness, discouragement and loneliness  · Talk of wanting to die  · Neglect of personal appearance   · Rebelliousness- reckless behavior  · Withdrawal from people/activities they love  · Confusion- inability to concentrate     If you or a loved one observes any of these behaviors or has concerns about self-harm, here's what you can do:  · Talk about it- your feelings and reasons for harming yourself  · Remove any means that you might use to hurt yourself (examples: pills, rope, extension cords, firearm)  · Get professional help from the  community (Mental Health, Substance Abuse, psychological counseling)  · Do not be alone:Call your Safe Contact- someone whom you trust who will be there for you.  · Call your local CRISIS HOTLINE 232-2079 or 429-044-3358  · Call your local Children's Mobile Crisis Response Team [place] (832) 279-5469 or www.[employer]  · Call the toll free National Suicide Prevention Hotlines   · National Suicide Prevention Lifeline 143-176-RPPL (4704)  · National Hope Line Network 800-SUICIDE (644-6434)

## 2018-04-28 NOTE — ASSESSMENT & PLAN NOTE
Trending down  Complete work-up by obtaining blood cultures  Likely related to gastroenteritis  On Reocephin flagyl empirically

## 2018-05-03 LAB
BACTERIA BLD CULT: NORMAL
BACTERIA BLD CULT: NORMAL
SIGNIFICANT IND 70042: NORMAL
SIGNIFICANT IND 70042: NORMAL
SITE SITE: NORMAL
SITE SITE: NORMAL
SOURCE SOURCE: NORMAL
SOURCE SOURCE: NORMAL

## 2018-05-18 ENCOUNTER — OFFICE VISIT (OUTPATIENT)
Dept: MEDICAL GROUP | Facility: MEDICAL CENTER | Age: 58
End: 2018-05-18
Payer: MEDICARE

## 2018-05-18 VITALS
HEIGHT: 75 IN | SYSTOLIC BLOOD PRESSURE: 122 MMHG | RESPIRATION RATE: 16 BRPM | BODY MASS INDEX: 38.3 KG/M2 | TEMPERATURE: 98.5 F | WEIGHT: 308 LBS | DIASTOLIC BLOOD PRESSURE: 78 MMHG | OXYGEN SATURATION: 95 % | HEART RATE: 78 BPM

## 2018-05-18 DIAGNOSIS — E11.43 DIABETIC GASTROPARESIS (HCC): ICD-10-CM

## 2018-05-18 DIAGNOSIS — K31.84 DIABETIC GASTROPARESIS (HCC): ICD-10-CM

## 2018-05-18 DIAGNOSIS — M54.50 CHRONIC BILATERAL LOW BACK PAIN WITHOUT SCIATICA: ICD-10-CM

## 2018-05-18 DIAGNOSIS — G89.29 CHRONIC BILATERAL LOW BACK PAIN WITHOUT SCIATICA: ICD-10-CM

## 2018-05-18 DIAGNOSIS — E11.65 TYPE 2 DIABETES MELLITUS WITH HYPERGLYCEMIA, WITH LONG-TERM CURRENT USE OF INSULIN (HCC): ICD-10-CM

## 2018-05-18 DIAGNOSIS — E87.6 HYPOKALEMIA: ICD-10-CM

## 2018-05-18 DIAGNOSIS — D72.829 LEUKOCYTOSIS, UNSPECIFIED TYPE: ICD-10-CM

## 2018-05-18 DIAGNOSIS — D64.9 ANEMIA, UNSPECIFIED TYPE: ICD-10-CM

## 2018-05-18 DIAGNOSIS — F11.20 UNCOMPLICATED OPIOID DEPENDENCE (HCC): ICD-10-CM

## 2018-05-18 DIAGNOSIS — I10 ESSENTIAL HYPERTENSION: ICD-10-CM

## 2018-05-18 DIAGNOSIS — Z79.4 TYPE 2 DIABETES MELLITUS WITH HYPERGLYCEMIA, WITH LONG-TERM CURRENT USE OF INSULIN (HCC): ICD-10-CM

## 2018-05-18 DIAGNOSIS — E66.9 OBESITY (BMI 30-39.9): ICD-10-CM

## 2018-05-18 PROBLEM — R06.02 SHORTNESS OF BREATH: Status: RESOLVED | Noted: 2018-03-15 | Resolved: 2018-05-18

## 2018-05-18 PROBLEM — E87.1 HYPONATREMIA: Status: RESOLVED | Noted: 2018-03-15 | Resolved: 2018-05-18

## 2018-05-18 PROBLEM — R07.9 CHEST PAIN: Status: RESOLVED | Noted: 2018-03-15 | Resolved: 2018-05-18

## 2018-05-18 PROBLEM — K52.9 GASTROENTERITIS: Status: RESOLVED | Noted: 2018-04-27 | Resolved: 2018-05-18

## 2018-05-18 PROBLEM — E11.9 T2DM (TYPE 2 DIABETES MELLITUS) (HCC): Status: RESOLVED | Noted: 2018-04-27 | Resolved: 2018-05-18

## 2018-05-18 PROBLEM — E11.10 DIABETIC KETOACIDOSIS WITHOUT COMA ASSOCIATED WITH TYPE 2 DIABETES MELLITUS (HCC): Status: RESOLVED | Noted: 2018-03-15 | Resolved: 2018-05-18

## 2018-05-18 PROCEDURE — 99214 OFFICE O/P EST MOD 30 MIN: CPT | Performed by: FAMILY MEDICINE

## 2018-05-18 RX ORDER — OXYCODONE HCL 40 MG/1
40 TABLET, FILM COATED, EXTENDED RELEASE ORAL EVERY 8 HOURS
Qty: 42 TAB | Refills: 0 | Status: SHIPPED | OUTPATIENT
Start: 2018-05-18 | End: 2018-06-01

## 2018-05-18 RX ORDER — HYDROCODONE BITARTRATE AND ACETAMINOPHEN 5; 325 MG/1; MG/1
1 TABLET ORAL 3 TIMES DAILY
Qty: 42 TAB | Refills: 0 | Status: SHIPPED | OUTPATIENT
Start: 2018-05-18 | End: 2018-06-01

## 2018-05-18 ASSESSMENT — PATIENT HEALTH QUESTIONNAIRE - PHQ9: CLINICAL INTERPRETATION OF PHQ2 SCORE: 0

## 2018-05-18 NOTE — LETTER
[employer]  Norberto Porras M.D.  [address]  [place] [address]  Fax: [phone]   Authorization for Release/Disclosure of   Protected Health Information   Name: CAROLYN CRAIG : 1960 SSN: [id number]   Address: [address] [place] [address]   [place] [address] Phone:    [phone] (home)    I authorize the entity listed below to release/disclose the PHI below to:   [employer]/Norberot Porras M.D. and Norberto Porras M.D.   Provider or Entity Name:   [facility]   Address   [facility], [facility], [place], [place] Phone:      Fax:     Reason for request: continuity of care   Information to be released:    [x ] LAST COLONOSCOPY,  including any PATH REPORT and follow-up  [  ] LAST FIT/COLOGUARD RESULT [  ] LAST DEXA  [  ] LAST MAMMOGRAM  [  ] LAST PAP  [  ] LAST LABS [  ] RETINA EXAM REPORT  [  ] IMMUNIZATION RECORDS  [  ] Release all info      [  ] Check here and initial the line next to each item to release ALL health information INCLUDING  _____ Care and treatment for drug and / or alcohol abuse  _____ HIV testing, infection status, or AIDS  _____ Genetic Testing    DATES OF SERVICE OR TIME PERIOD TO BE DISCLOSED: _____________  I understand and acknowledge that:  * This Authorization may be revoked at any time by you in writing, except if your health information has already been used or disclosed.  * Your health information that will be used or disclosed as a result of you signing this authorization could be re-disclosed by the recipient. If this occurs, your re-disclosed health information may no longer be protected by State or Federal laws.  * You may refuse to sign this Authorization. Your refusal will not affect your ability to obtain treatment.  * This Authorization becomes effective upon signing and will  on (date) __________.      If no date is indicated, this Authorization will  one (1) year from the signature date.    Name: Carolyn VÁZQUEZ  Blayne    Signature:   Date:     5/18/2018       PLEASE FAX REQUESTED RECORDS BACK TO: [phone]

## 2018-05-18 NOTE — LETTER
[employer]  Norberto Porras M.D.  [address]  [place] [address]  Fax: [phone]   Authorization for Release/Disclosure of   Protected Health Information   Name: CAROLYN CRAIG : 1960 SSN: [id number]   Address: [address] [place] [address]   [place] [address] Phone:    [phone] (home)    I authorize the entity listed below to release/disclose the PHI below to:   [employer]/Norberto Porras M.D. and Norberto Porras M.D.   Provider or Entity Name:   Dr Shawn Fischer   Address   [facility], [facility] Phone:      Fax:     Reason for request: continuity of care   Information to be released:    [  ] LAST COLONOSCOPY,  including any PATH REPORT and follow-up  [  ] LAST FIT/COLOGUARD RESULT [  ] LAST DEXA  [  ] LAST MAMMOGRAM  [  ] LAST PAP  [  ] LAST LABS [  ] RETINA EXAM REPORT  [x ] IMMUNIZATION RECORDS  [x ] Release all info      [  ] Check here and initial the line next to each item to release ALL health information INCLUDING  _____ Care and treatment for drug and / or alcohol abuse  _____ HIV testing, infection status, or AIDS  _____ Genetic Testing    DATES OF SERVICE OR TIME PERIOD TO BE DISCLOSED: _____________  I understand and acknowledge that:  * This Authorization may be revoked at any time by you in writing, except if your health information has already been used or disclosed.  * Your health information that will be used or disclosed as a result of you signing this authorization could be re-disclosed by the recipient. If this occurs, your re-disclosed health information may no longer be protected by State or Federal laws.  * You may refuse to sign this Authorization. Your refusal will not affect your ability to obtain treatment.  * This Authorization becomes effective upon signing and will  on (date) __________.      If no date is indicated, this Authorization will  one (1) year from the signature date.    Name: Carolyn Craig    Signature:   Date:      5/18/2018       PLEASE FAX REQUESTED RECORDS BACK TO: [phone]

## 2018-05-18 NOTE — PROGRESS NOTES
cc: Diabetes    Subjective:     Dakota Cisneros is a 57 y.o. male presenting  to [facility] care:      1.  Diabetes: Has long-standing history of uncontrolled diabetes.  Is currently on basaglar 60 units at night, novolin r 20u tid before meals.  Is currently being followed by endocrinology, has an appointment with them next month.  Last hemoglobin A1c in March was 13.3.  Last lipid panel was 3/2018.  He does not take an aspirin.  He also reports having gastroparesis, currently taking Reglan, probiotics, Nexium with some improvement.  He does have numbness and tingling in his feet bilaterally, but he attributes this to his back pain, see below    2.  Hypertension: Is currently taking lisinopril 10 mg daily for high blood pressure.  Is currently stable.  Denies any chest pain, shortness of breath, lightheadedness, dizziness, leg swelling.  He did have an episode of chest pain back in February.  He had a nuclear stress test in March, which was normal.    3.  Back pain: Has a long-standing history of chronic low back pain.  In 1994, he fell and injured his back.  He has been on oxycodone 40 mg 3 times a day, Norco 5-325 mg 3 times a day with no issues.  Denies any side effects, constipation, nausea.  His previous primary care doctor in [place] was writing these prescriptions.  He will be running out of medications tomorrow.  He is interested in seeing a pain specialist, is interested in getting a pain pump if appropriate.    Review of systems:  See above.        Current Outpatient Prescriptions:   •  ASPIRIN 81 PO, Take  by mouth., Disp: , Rfl:   •  HYDROcodone-acetaminophen (NORCO) 5-325 MG Tab per tablet, Take 1 Tab by mouth 3 times a day for 14 days., Disp: 42 Tab, Rfl: 0  •  oxyCODONE CR (OXYCONTIN) 40 MG Tablet Extended Release 12 hour Abuse-Deterrent tablet, Take 1 Tab by mouth every 8 hours for 14 days., Disp: 42 Tab, Rfl: 0  •  Insulin Glargine (BASAGLAR KWIKPEN) 100 UNIT/ML Solution Pen-injector, Inject 60  "Units as instructed every evening., Disp: , Rfl:   •  insulin regular human (HUMULIN/NOVOLIN R), Inject 20 Units as instructed 3 times a day., Disp: , Rfl:   •  liraglutide (VICTOZA) 18 MG/3ML Solution Pen-injector injection, Inject 1.2 mg as instructed every day., Disp: , Rfl:   •  metoclopramide (REGLAN) 10 MG Tab, Take 10 mg by mouth 2 Times a Day. Sometimes adds a 3rd dose if needed, Disp: , Rfl:   •  Cholecalciferol 5000 units Tab, Take 5,000 Units by mouth every evening., Disp: , Rfl:   •  lisinopril (PRINIVIL) 10 MG Tab, Take 10 mg by mouth every evening., Disp: , Rfl:   •  esomeprazole (NEXIUM) 40 MG delayed-release capsule, Take 40 mg by mouth every morning before breakfast., Disp: , Rfl:     No Known Allergies    History reviewed. No pertinent past medical history.  Past Surgical History:   Procedure Laterality Date   • OTHER ORTHOPEDIC SURGERY      hip debridement   • OTHER ORTHOPEDIC SURGERY      back surgery     History reviewed. No pertinent family history.  Social History     Social History   • Marital status: Single     Spouse name: N/A   • Number of children: N/A   • Years of education: N/A     Occupational History   • Not on file.     Social History Main Topics   • Smoking status: Never Smoker   • Smokeless tobacco: Never Used   • Alcohol use No   • Drug use: No   • Sexual activity: Not on file     Other Topics Concern   • Not on file     Social History Narrative   • No narrative on file       Objective:     Vitals: /78   Pulse 78   Temp 36.9 °C (98.5 °F)   Resp 16   Ht 1.905 m (6' 3\")   Wt (!) 139.7 kg (308 lb)   SpO2 95%   BMI 38.50 kg/m²   General: Alert, pleasant, NAD  HEENT: Normocephalic  Psych:  Affect/mood is normal, judgement is good, memory is intact, grooming is appropriate.    Assessment/Plan:     Diagnoses and all orders for this visit:    Type 2 diabetes mellitus with hyperglycemia, with long-term current use of insulin (HCC)  Uncontrolled, patient would like to discuss " with his endocrinologist before making any adjustments today.  Follow-up in 3 months    Diabetic gastroparesis (HCC)  Discussed management of gastroparesis, smaller, multiple meals throughout the day, avoiding food triggers.  Continue Reglan and Nexium    Essential hypertension  Stable, continue lisinopril  -     BASIC METABOLIC PANEL; Future    Chronic bilateral low back pain without sciatica  Uncomplicated opioid dependence (HCC)  Discussed that given the high doses of his medications, we will not continue to prescribe these.  a 2 week prescription was given to the patient in the meantime.  He plans to go back to his primary care provider in [place] for future refills until he can see pain management.  [place] and [place]  checked, no records listed.  ORT 0. Consent signed  -     HYDROcodone-acetaminophen (NORCO) 5-325 MG Tab per tablet; Take 1 Tab by mouth 3 times a day for 14 days.  -     oxyCODONE CR (OXYCONTIN) 40 MG Tablet Extended Release 12 hour Abuse-Deterrent tablet; Take 1 Tab by mouth every 8 hours for 14 days.  -     CONSENT FOR OPIATE PRESCRIPTION  -     REFERRAL TO PAIN CLINIC    Leukocytosis, unspecified type  Was elevated during his hospitalization, will repeat  -     CBC WITH DIFFERENTIAL; Future    Hypokalemia  -     BASIC METABOLIC PANEL; Future    Obesity (BMI 30-39.9)  -     Patient identified as having weight management issue.  Appropriate orders and counseling given.    Anemia, unspecified type  -     CBC WITH DIFFERENTIAL; Future  -     FERRITIN; Future  -     IRON/TOTAL IRON BIND; Future      Records requested from his previous clinic      Return in about 3 months (around 8/18/2018) for routine follow up.

## 2018-08-20 ENCOUNTER — OFFICE VISIT (OUTPATIENT)
Dept: MEDICAL GROUP | Facility: MEDICAL CENTER | Age: 58
End: 2018-08-20
Payer: MEDICARE

## 2018-08-20 VITALS
RESPIRATION RATE: 16 BRPM | WEIGHT: 312 LBS | HEIGHT: 75 IN | TEMPERATURE: 98.6 F | BODY MASS INDEX: 38.79 KG/M2 | SYSTOLIC BLOOD PRESSURE: 124 MMHG | DIASTOLIC BLOOD PRESSURE: 80 MMHG | HEART RATE: 74 BPM | OXYGEN SATURATION: 98 %

## 2018-08-20 DIAGNOSIS — F11.20 UNCOMPLICATED OPIOID DEPENDENCE (HCC): ICD-10-CM

## 2018-08-20 DIAGNOSIS — Z79.4 TYPE 2 DIABETES MELLITUS WITH HYPERGLYCEMIA, WITH LONG-TERM CURRENT USE OF INSULIN (HCC): ICD-10-CM

## 2018-08-20 DIAGNOSIS — E66.9 OBESITY (BMI 30-39.9): ICD-10-CM

## 2018-08-20 DIAGNOSIS — Z11.59 NEED FOR HEPATITIS C SCREENING TEST: ICD-10-CM

## 2018-08-20 DIAGNOSIS — I10 ESSENTIAL HYPERTENSION: ICD-10-CM

## 2018-08-20 DIAGNOSIS — G89.29 CHRONIC BILATERAL LOW BACK PAIN WITHOUT SCIATICA: ICD-10-CM

## 2018-08-20 DIAGNOSIS — M54.50 CHRONIC BILATERAL LOW BACK PAIN WITHOUT SCIATICA: ICD-10-CM

## 2018-08-20 DIAGNOSIS — D64.9 ANEMIA, UNSPECIFIED TYPE: ICD-10-CM

## 2018-08-20 DIAGNOSIS — E11.65 TYPE 2 DIABETES MELLITUS WITH HYPERGLYCEMIA, WITH LONG-TERM CURRENT USE OF INSULIN (HCC): ICD-10-CM

## 2018-08-20 DIAGNOSIS — Z11.59 ENCOUNTER FOR SCREENING FOR OTHER VIRAL DISEASES: ICD-10-CM

## 2018-08-20 DIAGNOSIS — Z86.19 HX OF TYPE B VIRAL HEPATITIS: ICD-10-CM

## 2018-08-20 PROCEDURE — 99214 OFFICE O/P EST MOD 30 MIN: CPT | Performed by: FAMILY MEDICINE

## 2018-08-20 RX ORDER — OXYCODONE HCL 40 MG/1
40 TABLET, FILM COATED, EXTENDED RELEASE ORAL EVERY 12 HOURS
COMMUNITY

## 2018-08-20 RX ORDER — HYDROCODONE BITARTRATE AND ACETAMINOPHEN 5; 325 MG/1; MG/1
1-2 TABLET ORAL EVERY 4 HOURS PRN
COMMUNITY

## 2018-08-20 RX ORDER — INSULIN GLARGINE 100 [IU]/ML
85 INJECTION, SOLUTION SUBCUTANEOUS EVERY EVENING
COMMUNITY
Start: 2018-08-20 | End: 2019-11-13

## 2018-08-20 RX ORDER — MELOXICAM 15 MG/1
15 TABLET ORAL
Qty: 90 TAB | Refills: 1 | Status: SHIPPED | OUTPATIENT
Start: 2018-08-20 | End: 2019-02-10 | Stop reason: SDUPTHER

## 2018-08-20 NOTE — PROGRESS NOTES
cc:  diabetes    Subjective:     Dakota Cisneros is a 57 y.o. male presenting for:      1. Diabetes: is now managed by endocrinology who he sees regularly  --asa: takes 81mg daily, no stomach or stool issues  --a1c: pt reports was recently 11, was 13.3 on 3/2018. Is currently taking basaglar 85 units at night, novolin r 30u tid before meals, victoza 1.8mg daily.   --BP: normal today.  Has been normotensive. Denies any chest pain, sob, leg swelling.  Is currently on lisinopril 10mg daily.  Last BMP was 4/2018.  --LDL: last checked 3/2018.  Is not on a statin  --tobacco: nonsmoker  --eye exam: last exam over a year ago.  No vision changes.  --foot exam: last exam unknown.  Does have numbness/tingling in feet bilaterally that he attributes to back pain.  Also has gastroparesis for which he takes Reglan, probiotics, Nexium with some improvement  --microalbumin: last checked unknown.  No urinary symptoms.  He does all his labs at [facility], will request records    2.  Back pain: Has a long-standing history of chronic low back pain.  In 1994, he fell and injured his back.  He has been on oxycodone 40 mg 3 times a day, Norco 5-325 mg 3 times a day with no issues.  Denies any side effects, constipation, nausea.  His primary care doctor in [place] continues to write for these prescriptions.  He was referred to a pain specialist at his last visit, he reports that they were not willing to prescribe his medications nor interested in discussing a pain pump. Is interested in another referral.  He does not take any anti-inflammatories regularly. He reports that he tried ibuprofen for kidney stone pain in the past which was quite helpful    Review of systems:  See above.       Current Outpatient Prescriptions:   •  oxyCODONE CR (OXYCONTIN) 40 MG Tablet Extended Release 12 hour Abuse-Deterrent tablet, Take 40 mg by mouth every 12 hours., Disp: , Rfl:   •  HYDROcodone-acetaminophen (NORCO) 5-325 MG Tab per tablet, Take 1-2 Tabs by  "mouth every four hours as needed., Disp: , Rfl:   •  meloxicam (MOBIC) 15 MG tablet, Take 1 Tab by mouth 1 time daily as needed for Moderate Pain. (take with food), Disp: 90 Tab, Rfl: 1  •  Insulin Glargine (BASAGLAR KWIKPEN) 100 UNIT/ML Solution Pen-injector, Inject 85 Units as instructed every evening., Disp: , Rfl:   •  insulin regular human (HUMULIN/NOVOLIN R), Inject 30 Units as instructed 3 times a day., Disp: , Rfl:   •  liraglutide (VICTOZA) 18 MG/3ML Solution Pen-injector injection, Inject 0.3 mL as instructed every day., Disp: , Rfl:   •  ASPIRIN 81 PO, Take  by mouth., Disp: , Rfl:   •  metoclopramide (REGLAN) 10 MG Tab, Take 10 mg by mouth 2 Times a Day. Sometimes adds a 3rd dose if needed, Disp: , Rfl:   •  Cholecalciferol 5000 units Tab, Take 5,000 Units by mouth every evening., Disp: , Rfl:   •  lisinopril (PRINIVIL) 10 MG Tab, Take 10 mg by mouth every evening., Disp: , Rfl:   •  esomeprazole (NEXIUM) 40 MG delayed-release capsule, Take 40 mg by mouth every morning before breakfast., Disp: , Rfl:     Allergies, past medical history, past surgical history, family history, social history reviewed and updated    Objective:     Vitals: /80   Pulse 74   Temp 37 °C (98.6 °F)   Resp 16   Ht 1.905 m (6' 3\")   Wt (!) 141.5 kg (312 lb)   SpO2 98%   BMI 39.00 kg/m²   General: Alert, pleasant, NAD  HEENT: Normocephalic.    Psych:  Affect/mood is normal, judgement is good, memory is intact, grooming is appropriate.    Assessment/Plan:     Dakota was seen today for diabetes mellitus.    Diagnoses and all orders for this visit:    Type 2 diabetes mellitus with hyperglycemia, with long-term current use of insulin (HCC)  Improving, managed by endocrinology. Discussed statin, he will discuss with his endocrinologist at his next visit. Recommended yearly eye exam, referral placed.  We also discussed hepatitis B vaccination. He reports that he might have had hep B in the past, but would like to be checked " first.         -     REFERRAL TO OPHTHALMOLOGY    Chronic bilateral low back pain without sciatica  Uncomplicated opioid dependence (HCC)  Stable.  New referral placed.  Will also try meloxicam  -     REFERRAL TO PAIN CLINIC  -     meloxicam (MOBIC) 15 MG tablet; Take 1 Tab by mouth 1 time daily as needed for Moderate Pain. (take with food)    Hx of type B viral hepatitis  -     HEP B SURFACE ANTIGEN; Future  -     HEP B CORE AB TOTAL; Future  -     HEP B SURFACE AB; Future    Need for hepatitis C screening test  -     HEP C VIRUS ANTIBODY; Future    Encounter for screening for other viral diseases   -     HEP C VIRUS ANTIBODY; Future  -     HEP B SURFACE ANTIGEN; Future  -     HEP B CORE AB TOTAL; Future  -     HEP B SURFACE AB; Future    Anemia, unspecified type  He was mildly anemic in the hospital, he reports doing repeat labs at [facility], records will be requested.  Colonoscopy records also requested again    Obesity (BMI 30-39.9)  Stable, discussed healthy diet and exercise. Offered referral to a dietitian, medical weight management program, bariatric surgery. He declined    Essential hypertension  Stable, continue lisinopril        Return in about 6 months (around 2/20/2019) for routine follow up.

## 2018-09-20 ENCOUNTER — HOSPITAL ENCOUNTER (OUTPATIENT)
Dept: RADIOLOGY | Facility: MEDICAL CENTER | Age: 58
End: 2018-09-20
Attending: NURSE PRACTITIONER
Payer: MEDICARE

## 2018-09-20 DIAGNOSIS — M47.816 LUMBAR SPONDYLOSIS: ICD-10-CM

## 2018-09-20 PROCEDURE — 72110 X-RAY EXAM L-2 SPINE 4/>VWS: CPT

## 2018-09-24 PROBLEM — E87.6 HYPOKALEMIA: Status: RESOLVED | Noted: 2018-05-18 | Resolved: 2018-09-24

## 2018-09-24 PROBLEM — D64.9 ANEMIA: Status: RESOLVED | Noted: 2018-05-18 | Resolved: 2018-09-24

## 2019-02-10 RX ORDER — MELOXICAM 15 MG/1
15 TABLET ORAL
Qty: 90 TAB | Refills: 1 | Status: SHIPPED | OUTPATIENT
Start: 2019-02-10 | End: 2019-11-13

## 2019-02-21 ENCOUNTER — OFFICE VISIT (OUTPATIENT)
Dept: MEDICAL GROUP | Facility: MEDICAL CENTER | Age: 59
End: 2019-02-21
Payer: MEDICARE

## 2019-02-21 VITALS
DIASTOLIC BLOOD PRESSURE: 78 MMHG | BODY MASS INDEX: 39.17 KG/M2 | OXYGEN SATURATION: 97 % | WEIGHT: 315 LBS | HEIGHT: 75 IN | HEART RATE: 76 BPM | RESPIRATION RATE: 16 BRPM | TEMPERATURE: 98.6 F | SYSTOLIC BLOOD PRESSURE: 122 MMHG

## 2019-02-21 DIAGNOSIS — G62.9 PERIPHERAL POLYNEUROPATHY: ICD-10-CM

## 2019-02-21 DIAGNOSIS — I10 ESSENTIAL HYPERTENSION: ICD-10-CM

## 2019-02-21 DIAGNOSIS — M54.50 CHRONIC BILATERAL LOW BACK PAIN WITHOUT SCIATICA: ICD-10-CM

## 2019-02-21 DIAGNOSIS — D72.829 LEUKOCYTOSIS, UNSPECIFIED TYPE: ICD-10-CM

## 2019-02-21 DIAGNOSIS — Z79.4 TYPE 2 DIABETES MELLITUS WITH HYPERGLYCEMIA, WITH LONG-TERM CURRENT USE OF INSULIN (HCC): ICD-10-CM

## 2019-02-21 DIAGNOSIS — R06.02 SHORTNESS OF BREATH ON EXERTION: ICD-10-CM

## 2019-02-21 DIAGNOSIS — E78.1 HYPERTRIGLYCERIDEMIA: ICD-10-CM

## 2019-02-21 DIAGNOSIS — G89.29 CHRONIC BILATERAL LOW BACK PAIN WITHOUT SCIATICA: ICD-10-CM

## 2019-02-21 DIAGNOSIS — F11.20 UNCOMPLICATED OPIOID DEPENDENCE (HCC): ICD-10-CM

## 2019-02-21 DIAGNOSIS — K31.84 DIABETIC GASTROPARESIS (HCC): ICD-10-CM

## 2019-02-21 DIAGNOSIS — E11.43 DIABETIC GASTROPARESIS (HCC): ICD-10-CM

## 2019-02-21 DIAGNOSIS — E66.01 MORBID OBESITY WITH BMI OF 40.0-44.9, ADULT (HCC): ICD-10-CM

## 2019-02-21 DIAGNOSIS — Z00.00 MEDICARE ANNUAL WELLNESS VISIT, SUBSEQUENT: ICD-10-CM

## 2019-02-21 DIAGNOSIS — E11.65 TYPE 2 DIABETES MELLITUS WITH HYPERGLYCEMIA, WITH LONG-TERM CURRENT USE OF INSULIN (HCC): ICD-10-CM

## 2019-02-21 PROBLEM — E66.9 OBESITY (BMI 30-39.9): Status: RESOLVED | Noted: 2018-05-18 | Resolved: 2019-02-21

## 2019-02-21 PROCEDURE — G0438 PPPS, INITIAL VISIT: HCPCS | Performed by: FAMILY MEDICINE

## 2019-02-21 PROCEDURE — 99213 OFFICE O/P EST LOW 20 MIN: CPT | Mod: 25 | Performed by: FAMILY MEDICINE

## 2019-02-21 RX ORDER — BLOOD-GLUCOSE METER
KIT MISCELLANEOUS
Refills: 1 | COMMUNITY
Start: 2018-12-07

## 2019-02-21 RX ORDER — TESTOSTERONE CYPIONATE 200 MG/ML
INJECTION, SOLUTION INTRAMUSCULAR
Refills: 3 | COMMUNITY
Start: 2019-02-14

## 2019-02-21 RX ORDER — HUMAN INSULIN 100 [IU]/ML
INJECTION, SOLUTION SUBCUTANEOUS
Refills: 3 | COMMUNITY
Start: 2019-02-10 | End: 2019-11-13

## 2019-02-21 ASSESSMENT — ENCOUNTER SYMPTOMS: GENERAL WELL-BEING: FAIR

## 2019-02-21 ASSESSMENT — PATIENT HEALTH QUESTIONNAIRE - PHQ9: CLINICAL INTERPRETATION OF PHQ2 SCORE: 0

## 2019-02-21 ASSESSMENT — ACTIVITIES OF DAILY LIVING (ADL): BATHING_REQUIRES_ASSISTANCE: 0

## 2019-02-21 NOTE — PROGRESS NOTES
cc:  Shortness of breath, wellness    Subjective:     Dakota Cisneros is a 58 y.o. male presenting for:    1.  Shortness of breath: Has been feeling somewhat short of breath for the past month or so.  It is intermittent, only triggered with exertion.  Symptoms last for couple seconds and then resolved on its own.  Denies any associated symptoms.  Denies any nausea, vomiting, fevers, chest pain, palpitations, lightheadedness, dizziness, headaches, vision changes, leg swelling, orthopnea.  He had a nuclear stress test and an echo last year.  Does not smoke.    2.  Diabetes: Has long-standing history of uncontrolled diabetes, is being managed by endocrinology.  Is currently on basaglar 85 units at night, novolin r tid before meals, victoza 1.8mg.  He reports that his A1c was 9 about 3 months ago.  His gastroparesis has been stable, currently taking Reglan, probiotics, Nexium with some improvement.    His peripheral neuropathy seems to be getting worse.  Is considering doing a clinical trial study to see if he can get a spinal cord stimulator placed     3.  Back pain: Has a long-standing history of chronic low back pain.  In 1994, he fell and injured his back.  He has established with pain management, his plan was to get a spinal cord stimulator replaced, but was too expensive for him.  He continues on the OxyContin and Norco.    4.  Hypertension: has hx of arterial htn, has been stable.  Is currently taking lisinopril 10 mg daily.  Denies any chest pain, shortness of breath, lightheadedness, dizziness, leg swelling.       Review of systems:  See above.       Current Outpatient Prescriptions:   •  Diclofenac Sodium (VOLTAREN) 1 % Gel, Apply  to skin as directed., Disp: , Rfl:   •  Blood Glucose Monitoring Suppl (FREESTYLE FREEDOM LITE) w/Device Kit, USE TO CHECK BLOOD SUGAR DX:E11.65, Disp: , Rfl: 1  •  NOVOLIN R 100 UNIT/ML Solution, USE AS DIRECTED TO INJECT UP TO 35 UNITS BEFORE MEALS, Disp: , Rfl: 3  •  testosterone  "cypionate (DEPO-TESTOSTERONE) 200 MG/ML Solution injection, INJECT 0.5 ML INTRAMUSCULARLY EVERY WEEK, Disp: , Rfl: 3  •  meloxicam (MOBIC) 15 MG tablet, TAKE 1 TAB BY MOUTH 1 TIME DAILY AS NEEDED FOR MODERATE PAIN. (TAKE WITH FOOD), Disp: 90 Tab, Rfl: 1  •  oxyCODONE CR (OXYCONTIN) 40 MG Tablet Extended Release 12 hour Abuse-Deterrent tablet, Take 40 mg by mouth every 12 hours., Disp: , Rfl:   •  HYDROcodone-acetaminophen (NORCO) 5-325 MG Tab per tablet, Take 1-2 Tabs by mouth every four hours as needed., Disp: , Rfl:   •  Insulin Glargine (BASAGLAR KWIKPEN) 100 UNIT/ML Solution Pen-injector, Inject 85 Units as instructed every evening., Disp: , Rfl:   •  liraglutide (VICTOZA) 18 MG/3ML Solution Pen-injector injection, Inject 0.3 mL as instructed every day., Disp: , Rfl:   •  ASPIRIN 81 PO, Take  by mouth., Disp: , Rfl:   •  metoclopramide (REGLAN) 10 MG Tab, Take 10 mg by mouth 2 Times a Day. Sometimes adds a 3rd dose if needed, Disp: , Rfl:   •  Cholecalciferol 5000 units Tab, Take 5,000 Units by mouth every evening., Disp: , Rfl:   •  lisinopril (PRINIVIL) 10 MG Tab, Take 10 mg by mouth every evening., Disp: , Rfl:   •  esomeprazole (NEXIUM) 40 MG delayed-release capsule, Take 40 mg by mouth every morning before breakfast., Disp: , Rfl:     Allergies, past medical history, past surgical history, family history, social history reviewed and updated    Objective:     Vitals: /78 (BP Location: Right arm, Patient Position: Sitting)   Pulse 76   Temp 37 °C (98.6 °F) (Temporal)   Resp 16   Ht 1.905 m (6' 3\")   Wt (!) 149.7 kg (330 lb)   SpO2 97%   BMI 41.25 kg/m²   General: Alert, pleasant, NAD  HEENT: Normocephalic.  EOMI, no icterus or pallor.  Conjunctivae and lids normal. External ears normal. Oropharynx non-erythematous, mucous membranes moist.  Neck supple.  No thyromegaly or masses palpated. No cervical or supraclavicular lymphadenopathy.  Heart: Regular rate and rhythm.  S1 and S2 normal.  No " murmurs appreciated.  Respiratory: Normal respiratory effort.  Clear to auscultation bilaterally.  Abdomen: Non-distended, soft  Skin: Warm, dry, no rashes.  Musculoskeletal: Gait is normal.  Moves all extremities well.  Extremities: No leg edema.    Psych:  Affect/mood is normal, judgement is good, memory is intact, grooming is appropriate.    Assessment/Plan:     Diagnoses and all orders for this visit:    Shortness of breath on exertion  New problem.  We will check the following labs, chest x-ray, PFTs.  Cardiac workup was unremarkable last year.  Discussed regular exercise and if workup is unremarkable, suspect deconditioning/weight  -     PULMONARY FUNCTION TESTS -Test requested: Complete Pulmonary Function Test; Future  -     DX-CHEST-2 VIEWS; Future  -     Comp Metabolic Panel; Future  -     TSH WITH REFLEX TO FT4; Future    Hypertriglyceridemia  Discussed his elevated triglycerides on his lipid panel last year.  Discussed starting a statin, he would like to think about this for now.  We will recheck a lipid panel.  -     Lipid Profile; Future    Essential hypertension  Stable, continue current medications.  -     CBC WITH DIFFERENTIAL; Future  -     Comp Metabolic Panel; Future  -     TSH WITH REFLEX TO FT4; Future      Return in about 6 months (around 8/21/2019) for routine follow up.

## 2019-02-21 NOTE — PROGRESS NOTES
Cc: shortness of breath, wellness    HPI:  Dakota Cisneros is a 58 y.o. here for Medicare Annual Wellness Visit     Patient Active Problem List    Diagnosis Date Noted   • Peripheral polyneuropathy ([facility]) 02/21/2019   • Hypertriglyceridemia 02/21/2019   • Morbid obesity with BMI of 40.0-44.9, adult ([facility]) 02/21/2019   • Type 2 diabetes mellitus with hyperglycemia, with long-term current use of insulin ([facility]) 05/18/2018   • Uncomplicated opioid dependence ([facility]) 05/18/2018   • Chronic bilateral low back pain without sciatica 05/18/2018   • Diabetic gastroparesis ([facility]) 04/28/2018   • Leukocytosis 03/15/2018   • Essential hypertension 03/15/2018       Current Outpatient Prescriptions   Medication Sig Dispense Refill   • Diclofenac Sodium (VOLTAREN) 1 % Gel Apply  to skin as directed.     • Blood Glucose Monitoring Suppl (FREESTYLE FREEDOM LITE) w/Device Kit USE TO CHECK BLOOD SUGAR DX:E11.65  1   • NOVOLIN R 100 UNIT/ML Solution USE AS DIRECTED TO INJECT UP TO 35 UNITS BEFORE MEALS  3   • testosterone cypionate (DEPO-TESTOSTERONE) 200 MG/ML Solution injection INJECT 0.5 ML INTRAMUSCULARLY EVERY WEEK  3   • meloxicam (MOBIC) 15 MG tablet TAKE 1 TAB BY MOUTH 1 TIME DAILY AS NEEDED FOR MODERATE PAIN. (TAKE WITH FOOD) 90 Tab 1   • oxyCODONE CR (OXYCONTIN) 40 MG Tablet Extended Release 12 hour Abuse-Deterrent tablet Take 40 mg by mouth every 12 hours.     • HYDROcodone-acetaminophen (NORCO) 5-325 MG Tab per tablet Take 1-2 Tabs by mouth every four hours as needed.     • Insulin Glargine (BASAGLAR KWIKPEN) 100 UNIT/ML Solution Pen-injector Inject 85 Units as instructed every evening.     • liraglutide (VICTOZA) 18 MG/3ML Solution Pen-injector injection Inject 0.3 mL as instructed every day.     • ASPIRIN 81 PO Take  by mouth.     • metoclopramide (REGLAN) 10 MG Tab Take 10 mg by mouth 2 Times a Day. Sometimes adds a 3rd dose if needed     • Cholecalciferol 5000 units Tab Take 5,000 Units by mouth every evening.     • lisinopril  (PRINIVIL) 10 MG Tab Take 10 mg by mouth every evening.     • esomeprazole (NEXIUM) 40 MG delayed-release capsule Take 40 mg by mouth every morning before breakfast.       No current facility-administered medications for this visit.             Current supplements as per medication list.       Allergies: Patient has no known allergies.    Current social contact/activities: watching tv     He  reports that he has never smoked. He has never used smokeless tobacco. He reports that he does not drink alcohol or use drugs.  Counseling given: Yes      DPA/Advanced Directive:  Patient does not have an Advanced Directive.  A packet and workshop information was given on Advanced Directives.    ROS:    Gait: Uses no assistive device  Ostomy: No  Other tubes: No  Amputations: No  Chronic oxygen use: No  Last eye exam: about 1.5 yrs ago  Wears hearing aids: No   : Denies any urinary leakage during the last 6 months    Screening:    Depression Screening    Little interest or pleasure in doing things?  0 - not at all  Feeling down, depressed , or hopeless? 0 - not at all  Patient Health Questionnaire Score: 0     If depressive symptoms identified deferred to follow up visit unless specifically addressed in assessment and plan.    Interpretation of PHQ-9 Total Score   Score Severity   1-4 No Depression   5-9 Mild Depression   10-14 Moderate Depression   15-19 Moderately Severe Depression   20-27 Severe Depression    Screening for Cognitive Impairment    Three Minute Recall (leader, season, table) 2/3    Murali clock face with all 12 numbers and set the hands to show 10 past 11.  Yes 5/5  Cognitive concerns identified deferred for follow up unless specifically addressed in assessment and plan.    Fall Risk Assessment    Has the patient had two or more falls in the last year or any fall with injury in the last year?  No    Safety Assessment    Throw rugs on floor.  Yes  Handrails on all stairs.  Yes  Good lighting in all hallways.   Yes  Difficulty hearing.  No  Patient counseled about all safety risks that were identified.    Functional Assessment ADLs    Are there any barriers preventing you from cooking for yourself or meeting nutritional needs?  No.    Are there any barriers preventing you from driving safely or obtaining transportation?  No.    Are there any barriers preventing you from using a telephone or calling for help?  No.    Are there any barriers preventing you from shopping?  No.    Are there any barriers preventing you from taking care of your own finances?  No.    Are there any barriers preventing you from managing your medications?  No.    Are there any barriers preventing you from showering, bathing or dressing yourself?  No.    Are you currently engaging in any exercise or physical activity?  No.     What is your perception of your health?  Fair.       Health Maintenance Summary                Annual Wellness Visit Overdue 1960     RETINAL SCREENING Overdue 12/15/1979     A1C SCREENING Overdue 9/15/2018      Done 3/15/2018 HEMOGLOBIN A1C     URINE ACR / MICROALBUMIN Postponed 8/20/2019 Originally 12/15/1979. Provider advises postponing for medical reasons    DIABETES MONOFILAMENT / LE EXAM Postponed 8/20/2019 Originally 6/15/1961. Provider advises postponing for medical reasons    IMM DTaP/Tdap/Td Vaccine Postponed 8/20/2019 Originally 12/15/1980. Insurance/Financial    IMM ZOSTER VACCINES Postponed 8/20/2020 Originally 12/15/2011. Insurance/Financial    COLONOSCOPY Postponed 2/21/2022 Originally 12/15/2011. Provider advises postponing for medical reasons.  (Patient reports doing this in [place] 2 years ago)    IMM HEP B VACCINE Postponed 2/21/2029 Originally 12/15/1980. Provider advises postponing for medical reasons    FASTING LIPID PROFILE Next Due 3/16/2019      Done 3/16/2018 LIPID PROFILE     SERUM CREATININE Next Due 9/20/2019      Done 9/20/2018 BASIC METABOLIC PANEL     Patient has more history with this  "topic...          Patient Care Team:  Norberto Porras M.D. as PCP - General (Family Medicine)  KRISTIE Peacock (Family Medicine)        Social History   Substance Use Topics   • Smoking status: Never Smoker   • Smokeless tobacco: Never Used   • Alcohol use No     History reviewed. No pertinent family history.  He  has no past medical history on file.   Past Surgical History:   Procedure Laterality Date   • OTHER ORTHOPEDIC SURGERY      hip debridement   • OTHER ORTHOPEDIC SURGERY      back surgery       Exam:   Blood pressure 122/78, pulse 76, temperature 37 °C (98.6 °F), temperature source Temporal, resp. rate 16, height 1.905 m (6' 3\"), weight (!) 149.7 kg (330 lb), SpO2 97 %. Body mass index is 41.25 kg/m².    Hearing excellent.    Dentition good  Alert, oriented in no acute distress.  Eye contact is good, speech goal directed, affect calm    Assessment and Plan. The following treatment and monitoring plan is recommended:      Medicare annual wellness visit, subsequent  -     Initial Annual Wellness Visit - Includes PPPS ()    Shortness of breath on exertion  We will check the following labs, testing.  Discussed healthy diet, regular exercise  -     PULMONARY FUNCTION TESTS -Test requested: Complete Pulmonary Function Test; Future  -     DX-CHEST-2 VIEWS; Future  -     Comp Metabolic Panel; Future  -     TSH WITH REFLEX TO FT4; Future    Hypertriglyceridemia  Recommended statin, he would like to consider this for now.  -     Lipid Profile; Future    Essential hypertension  Stable, continue current medications  -     CBC WITH DIFFERENTIAL; Future  -     Comp Metabolic Panel; Future  -     TSH WITH REFLEX TO FT4; Future    Type 2 diabetes mellitus with hyperglycemia, with long-term current use of insulin (HCC)  Stable, improving.  Managed by endocrinology  -     CBC WITH DIFFERENTIAL; Future  -     Comp Metabolic Panel; Future  -     MICROALBUMIN CREAT RATIO URINE; Future  -     Initial Annual " Wellness Visit - Includes PPPS ()    Diabetic gastroparesis (HCC)  Stable, continue current medications  -     Initial Annual Wellness Visit - Includes PPPS ()    Peripheral polyneuropathy (HCC)  Stable, continue to monitor  -     Initial Annual Wellness Visit - Includes PPPS ()    Chronic bilateral low back pain without sciatica  Stable, managed by pain management  -     Initial Annual Wellness Visit - Includes PPPS ()    Uncomplicated opioid dependence (HCC)  Stable, managed by pain management  -     Initial Annual Wellness Visit - Includes PPPS ()    Leukocytosis, unspecified type  Stable, due for labs  -     CBC WITH DIFFERENTIAL; Future  -     Initial Annual Wellness Visit - Includes PPPS ()    Morbid obesity with BMI of 40.0-44.9, adult (HCC)  Stable, discussed healthy diet and exercise. He declined a dietician referral.  -     Initial Annual Wellness Visit - Includes PPPS ()      Services suggested: No services needed at this time  Health Care Screening: Age-appropriate preventive services recommended by USPTF and ACIP covered by Medicare were discussed today. Services ordered if indicated and agreed upon by the patient.  Referrals offered: Community-based lifestyle interventions to reduce health risks and promote self-management and wellness, fall prevention, nutrition, physical activity, tobacco-use cessation, weight loss, and mental health services as per orders if indicated.    Discussion today about general wellness and lifestyle habits:    · Prevent falls and reduce trip hazards; Cautioned about securing or removing rugs.  · Have a working fire alarm and carbon monoxide detector;   · Engage in regular physical activity and social activities     Follow-up: Return in about 6 months (around 8/21/2019) for routine follow up.

## 2019-03-28 ENCOUNTER — HOSPITAL ENCOUNTER (OUTPATIENT)
Dept: PULMONOLOGY | Facility: MEDICAL CENTER | Age: 59
End: 2019-03-28
Attending: FAMILY MEDICINE
Payer: MEDICARE

## 2019-03-28 DIAGNOSIS — R06.02 SHORTNESS OF BREATH ON EXERTION: ICD-10-CM

## 2019-03-28 PROCEDURE — 94060 EVALUATION OF WHEEZING: CPT | Mod: 26 | Performed by: INTERNAL MEDICINE

## 2019-03-28 PROCEDURE — 94729 DIFFUSING CAPACITY: CPT

## 2019-03-28 PROCEDURE — 94729 DIFFUSING CAPACITY: CPT | Mod: 26 | Performed by: INTERNAL MEDICINE

## 2019-03-28 PROCEDURE — 94726 PLETHYSMOGRAPHY LUNG VOLUMES: CPT

## 2019-03-28 PROCEDURE — 94726 PLETHYSMOGRAPHY LUNG VOLUMES: CPT | Mod: 26 | Performed by: INTERNAL MEDICINE

## 2019-03-28 PROCEDURE — 94060 EVALUATION OF WHEEZING: CPT

## 2019-03-28 ASSESSMENT — PULMONARY FUNCTION TESTS
FVC_PREDICTED: 5.61
FEV1/FVC_PERCENT_PREDICTED: 77
FEV1/FVC_PERCENT_PREDICTED: 106
FEV1/FVC_PERCENT_LLN: 4.98
FEV1/FVC_PERCENT_PREDICTED: 107
FEV1/FVC: 82
FEV1/FVC_PERCENT_CHANGE: 64
FEV1/FVC: 109
FEV1_PERCENT_PREDICTED: 79
FVC_LLN: 4.368
FEV1/FVC_PERCENT_PREDICTED: 3
FEV1_PERCENT_CHANGE: 5
FEV1_PREDICTED: 4.3
FVC_PERCENT_PREDICTED: 1
FEV1_PERCENT_CHANGE: 1
FEV1_LLN: 84
FVC_LLN: 4.68
FEV1/FVC: 84.43
FEV1/FVC_PERCENT_CHANGE: 500
FEV1/FVC_PREDICTED: 77
FEV1/FVC: 82
FEV1/FVC_PERCENT_LLN: 64
FEV1: 3.58
FEV1: 3.4
FEV1_LLN: 3.59
FEV1/FVC_PERCENT_PREDICTED: 8300
FVC_PERCENT_PREDICTED: 74
FEV1_PERCENT_PREDICTED: 83
FVC: 4.16
FVC: 4.24

## 2019-04-02 ENCOUNTER — HOSPITAL ENCOUNTER (OUTPATIENT)
Dept: RADIOLOGY | Facility: MEDICAL CENTER | Age: 59
End: 2019-04-02
Attending: FAMILY MEDICINE
Payer: MEDICARE

## 2019-04-02 DIAGNOSIS — R06.02 SHORTNESS OF BREATH ON EXERTION: ICD-10-CM

## 2019-04-02 PROCEDURE — 71046 X-RAY EXAM CHEST 2 VIEWS: CPT

## 2019-04-02 NOTE — PROCEDURES
PULMONARY FUNCTION TEST INTERPRETATION    DATE OF STUDY:  03/28/2019    SPIROMETRY AND FLOW VOLUME LOOPS:  FEV1/FVC ratio is normal at 82, mild   reduction noted in FEV1 and FVC respectively at 79% and 74%.  There is no   significant response to bronchodilators.    LUNG VOLUMES:  FVC 77, IC 92, ERV 40, TGV 90, .    DIFFUSION:  DLCO 130, VA 82.    CONCLUSION:  1.  Mild, but significant extrathoracic restrictive lung disease, favoring   obesity.  2.  No evidence of obstruction.  3.  Preserved gas diffusion.  4.  Reduced alveolar volume, mildly, suggestive of atelectasis.       ____________________________________     Aahd MD PETER Henao / LISA    DD:  04/01/2019 17:24:36  DT:  04/01/2019 17:42:36    D#:  [id number]  Job#:  [id number]

## 2019-11-13 DIAGNOSIS — Z01.810 PRE-OPERATIVE CARDIOVASCULAR EXAMINATION: ICD-10-CM

## 2019-11-13 DIAGNOSIS — Z01.812 PRE-OPERATIVE LABORATORY EXAMINATION: ICD-10-CM

## 2019-11-13 LAB
ANION GAP SERPL CALC-SCNC: 10 MMOL/L (ref 0–11.9)
BUN SERPL-MCNC: 18 MG/DL (ref 8–22)
CALCIUM SERPL-MCNC: 8.8 MG/DL (ref 8.5–10.5)
CHLORIDE SERPL-SCNC: 106 MMOL/L (ref 96–112)
CO2 SERPL-SCNC: 25 MMOL/L (ref 20–33)
CREAT SERPL-MCNC: 1.15 MG/DL (ref 0.5–1.4)
EKG IMPRESSION: NORMAL
GLUCOSE SERPL-MCNC: 291 MG/DL (ref 65–99)
POTASSIUM SERPL-SCNC: 4.1 MMOL/L (ref 3.6–5.5)
SODIUM SERPL-SCNC: 141 MMOL/L (ref 135–145)

## 2019-11-13 PROCEDURE — 93010 ELECTROCARDIOGRAM REPORT: CPT | Performed by: INTERNAL MEDICINE

## 2019-11-13 PROCEDURE — 36415 COLL VENOUS BLD VENIPUNCTURE: CPT

## 2019-11-13 PROCEDURE — 80048 BASIC METABOLIC PNL TOTAL CA: CPT

## 2019-11-13 PROCEDURE — 83036 HEMOGLOBIN GLYCOSYLATED A1C: CPT

## 2019-11-13 PROCEDURE — 93005 ELECTROCARDIOGRAM TRACING: CPT

## 2019-11-13 NOTE — OR NURSING
PreAdmit Appointment: Patient given Preparing for your procedure handout. Patient instructed to continue regularly prescribed medications through day before surgery. Instructed to take the following medications the day of surgery with a sip of water per Anesthesia protocol: Nexium, Reglan, Oxycodone.

## 2019-11-14 LAB
EST. AVERAGE GLUCOSE BLD GHB EST-MCNC: 166 MG/DL
HBA1C MFR BLD: 7.4 % (ref 0–5.6)

## 2019-11-20 ENCOUNTER — ANESTHESIA EVENT (OUTPATIENT)
Dept: SURGERY | Facility: MEDICAL CENTER | Age: 59
End: 2019-11-20
Payer: MEDICARE

## 2019-11-20 NOTE — OR NURSING
It’s hard to believe that with that high of a score the patient does not have sleep apnea. My questions would be how long ago were those studies done and if we could get our hands on a copy of the results. Not really pertinent for this particular surgery but it would be good information to have.  Given his list of comorbidities, it is prudent to ask for a primary care visit and clearance prior to this plan procedure, especially in the setting of a general anesthetic. Thank you.  Darin Salgado M.D.  [facility]      On Nov 13, 2019, at 11:58, [email or web address] wrote:  ?   Scheduled for Spinal cord stimulator implant  BMI 40.76, LIVIA 7, Pt states he has had 2 sleep studies both resulted no sleep apnea per patient. DM- controlled with insulin & diet, patient states he is on BP meds as preventative states he has not had high blood pressure. GERD takes Nexium & Reglan.   Is there anything else we need prior to surgery?  Thanks,  Sylvia     Anesthesia Summary Report            Patient Name: Dakota Cisneros MRN: [id number] Admission Date: Patient not admitted   Allergies: No Known Allergies          58 y.o. / Male (1960)      Dakota Cisneros MRN:[id number] (CSN:[id number]) (58 y.o. M)      Comment      Last edited by on  at

## 2019-11-21 ENCOUNTER — HOSPITAL ENCOUNTER (OUTPATIENT)
Facility: MEDICAL CENTER | Age: 59
End: 2019-11-21
Attending: PAIN MEDICINE | Admitting: PAIN MEDICINE
Payer: MEDICARE

## 2019-11-21 ENCOUNTER — APPOINTMENT (OUTPATIENT)
Dept: RADIOLOGY | Facility: MEDICAL CENTER | Age: 59
End: 2019-11-21
Attending: PAIN MEDICINE
Payer: MEDICARE

## 2019-11-21 ENCOUNTER — ANESTHESIA (OUTPATIENT)
Dept: SURGERY | Facility: MEDICAL CENTER | Age: 59
End: 2019-11-21
Payer: MEDICARE

## 2019-11-21 VITALS
HEART RATE: 87 BPM | TEMPERATURE: 98.6 F | WEIGHT: 309.75 LBS | SYSTOLIC BLOOD PRESSURE: 128 MMHG | DIASTOLIC BLOOD PRESSURE: 73 MMHG | BODY MASS INDEX: 38.51 KG/M2 | OXYGEN SATURATION: 94 % | RESPIRATION RATE: 18 BRPM | HEIGHT: 75 IN

## 2019-11-21 LAB — GLUCOSE BLD-MCNC: 276 MG/DL (ref 65–99)

## 2019-11-21 PROCEDURE — 160041 HCHG SURGERY MINUTES - EA ADDL 1 MIN LEVEL 4: Performed by: PAIN MEDICINE

## 2019-11-21 PROCEDURE — 82962 GLUCOSE BLOOD TEST: CPT

## 2019-11-21 PROCEDURE — 700101 HCHG RX REV CODE 250: Performed by: PAIN MEDICINE

## 2019-11-21 PROCEDURE — 501838 HCHG SUTURE GENERAL: Performed by: PAIN MEDICINE

## 2019-11-21 PROCEDURE — C1822 GEN, NEURO, HF, RECHG BAT: HCPCS | Performed by: PAIN MEDICINE

## 2019-11-21 PROCEDURE — 160036 HCHG PACU - EA ADDL 30 MINS PHASE I: Performed by: PAIN MEDICINE

## 2019-11-21 PROCEDURE — A6402 STERILE GAUZE <= 16 SQ IN: HCPCS | Performed by: PAIN MEDICINE

## 2019-11-21 PROCEDURE — C1787 PATIENT PROGR, NEUROSTIM: HCPCS | Performed by: PAIN MEDICINE

## 2019-11-21 PROCEDURE — 700101 HCHG RX REV CODE 250: Performed by: ANESTHESIOLOGY

## 2019-11-21 PROCEDURE — 302699 HCHG ABDOMINAL BINDER: Performed by: PAIN MEDICINE

## 2019-11-21 PROCEDURE — 502240 HCHG MISC OR SUPPLY RC 0272: Performed by: PAIN MEDICINE

## 2019-11-21 PROCEDURE — 72070 X-RAY EXAM THORAC SPINE 2VWS: CPT

## 2019-11-21 PROCEDURE — 501445 HCHG STAPLER, SKIN DISP: Performed by: PAIN MEDICINE

## 2019-11-21 PROCEDURE — 160048 HCHG OR STATISTICAL LEVEL 1-5: Performed by: PAIN MEDICINE

## 2019-11-21 PROCEDURE — 502000 HCHG MISC OR IMPLANTS RC 0278: Performed by: PAIN MEDICINE

## 2019-11-21 PROCEDURE — 700111 HCHG RX REV CODE 636 W/ 250 OVERRIDE (IP): Performed by: PAIN MEDICINE

## 2019-11-21 PROCEDURE — 160009 HCHG ANES TIME/MIN: Performed by: PAIN MEDICINE

## 2019-11-21 PROCEDURE — 500448 HCHG DRESSING, TELFA 3X4: Performed by: PAIN MEDICINE

## 2019-11-21 PROCEDURE — A9270 NON-COVERED ITEM OR SERVICE: HCPCS | Performed by: PAIN MEDICINE

## 2019-11-21 PROCEDURE — 160025 RECOVERY II MINUTES (STATS): Performed by: PAIN MEDICINE

## 2019-11-21 PROCEDURE — 700111 HCHG RX REV CODE 636 W/ 250 OVERRIDE (IP): Performed by: ANESTHESIOLOGY

## 2019-11-21 PROCEDURE — 160035 HCHG PACU - 1ST 60 MINS PHASE I: Performed by: PAIN MEDICINE

## 2019-11-21 PROCEDURE — 160002 HCHG RECOVERY MINUTES (STAT): Performed by: PAIN MEDICINE

## 2019-11-21 PROCEDURE — 700102 HCHG RX REV CODE 250 W/ 637 OVERRIDE(OP): Performed by: PAIN MEDICINE

## 2019-11-21 PROCEDURE — 160046 HCHG PACU - 1ST 60 MINS PHASE II: Performed by: PAIN MEDICINE

## 2019-11-21 PROCEDURE — A9270 NON-COVERED ITEM OR SERVICE: HCPCS | Performed by: ANESTHESIOLOGY

## 2019-11-21 PROCEDURE — 700102 HCHG RX REV CODE 250 W/ 637 OVERRIDE(OP): Performed by: ANESTHESIOLOGY

## 2019-11-21 PROCEDURE — 700105 HCHG RX REV CODE 258: Performed by: PAIN MEDICINE

## 2019-11-21 PROCEDURE — C1778 LEAD, NEUROSTIMULATOR: HCPCS | Performed by: PAIN MEDICINE

## 2019-11-21 PROCEDURE — 160029 HCHG SURGERY MINUTES - 1ST 30 MINS LEVEL 4: Performed by: PAIN MEDICINE

## 2019-11-21 DEVICE — IMPLANTABLE DEVICE: Type: IMPLANTABLE DEVICE | Site: BACK | Status: FUNCTIONAL

## 2019-11-21 RX ORDER — SUCCINYLCHOLINE CHLORIDE 20 MG/ML
INJECTION INTRAMUSCULAR; INTRAVENOUS PRN
Status: DISCONTINUED | OUTPATIENT
Start: 2019-11-21 | End: 2019-11-21 | Stop reason: SURG

## 2019-11-21 RX ORDER — IPRATROPIUM BROMIDE AND ALBUTEROL SULFATE 2.5; .5 MG/3ML; MG/3ML
3 SOLUTION RESPIRATORY (INHALATION)
Status: DISCONTINUED | OUTPATIENT
Start: 2019-11-21 | End: 2019-11-21 | Stop reason: HOSPADM

## 2019-11-21 RX ORDER — MEPERIDINE HYDROCHLORIDE 25 MG/ML
6.25 INJECTION INTRAMUSCULAR; INTRAVENOUS; SUBCUTANEOUS
Status: DISCONTINUED | OUTPATIENT
Start: 2019-11-21 | End: 2019-11-21 | Stop reason: HOSPADM

## 2019-11-21 RX ORDER — GABAPENTIN 300 MG/1
CAPSULE ORAL
Status: DISCONTINUED
Start: 2019-11-21 | End: 2019-11-21 | Stop reason: HOSPADM

## 2019-11-21 RX ORDER — OXYCODONE HCL 5 MG/5 ML
5 SOLUTION, ORAL ORAL
Status: COMPLETED | OUTPATIENT
Start: 2019-11-21 | End: 2019-11-21

## 2019-11-21 RX ORDER — SODIUM CHLORIDE, SODIUM LACTATE, POTASSIUM CHLORIDE, CALCIUM CHLORIDE 600; 310; 30; 20 MG/100ML; MG/100ML; MG/100ML; MG/100ML
INJECTION, SOLUTION INTRAVENOUS CONTINUOUS
Status: DISCONTINUED | OUTPATIENT
Start: 2019-11-21 | End: 2019-11-21 | Stop reason: HOSPADM

## 2019-11-21 RX ORDER — ONDANSETRON 2 MG/ML
4 INJECTION INTRAMUSCULAR; INTRAVENOUS
Status: DISCONTINUED | OUTPATIENT
Start: 2019-11-21 | End: 2019-11-21 | Stop reason: HOSPADM

## 2019-11-21 RX ORDER — HYDROMORPHONE HYDROCHLORIDE 1 MG/ML
0.2 INJECTION, SOLUTION INTRAMUSCULAR; INTRAVENOUS; SUBCUTANEOUS
Status: DISCONTINUED | OUTPATIENT
Start: 2019-11-21 | End: 2019-11-21 | Stop reason: HOSPADM

## 2019-11-21 RX ORDER — HYDROMORPHONE HYDROCHLORIDE 1 MG/ML
0.4 INJECTION, SOLUTION INTRAMUSCULAR; INTRAVENOUS; SUBCUTANEOUS
Status: DISCONTINUED | OUTPATIENT
Start: 2019-11-21 | End: 2019-11-21 | Stop reason: HOSPADM

## 2019-11-21 RX ORDER — HALOPERIDOL 5 MG/ML
1 INJECTION INTRAMUSCULAR
Status: DISCONTINUED | OUTPATIENT
Start: 2019-11-21 | End: 2019-11-21 | Stop reason: HOSPADM

## 2019-11-21 RX ORDER — DEXAMETHASONE SODIUM PHOSPHATE 4 MG/ML
INJECTION, SOLUTION INTRA-ARTICULAR; INTRALESIONAL; INTRAMUSCULAR; INTRAVENOUS; SOFT TISSUE PRN
Status: DISCONTINUED | OUTPATIENT
Start: 2019-11-21 | End: 2019-11-21 | Stop reason: SURG

## 2019-11-21 RX ORDER — OXYCODONE HCL 5 MG/5 ML
10 SOLUTION, ORAL ORAL
Status: COMPLETED | OUTPATIENT
Start: 2019-11-21 | End: 2019-11-21

## 2019-11-21 RX ORDER — BACITRACIN ZINC 500 [USP'U]/G
OINTMENT TOPICAL
Status: DISCONTINUED | OUTPATIENT
Start: 2019-11-21 | End: 2019-11-21 | Stop reason: HOSPADM

## 2019-11-21 RX ORDER — BUPIVACAINE HYDROCHLORIDE 2.5 MG/ML
INJECTION, SOLUTION EPIDURAL; INFILTRATION; INTRACAUDAL
Status: DISCONTINUED | OUTPATIENT
Start: 2019-11-21 | End: 2019-11-21 | Stop reason: HOSPADM

## 2019-11-21 RX ORDER — CEFAZOLIN SODIUM 1 G/3ML
INJECTION, POWDER, FOR SOLUTION INTRAMUSCULAR; INTRAVENOUS PRN
Status: DISCONTINUED | OUTPATIENT
Start: 2019-11-21 | End: 2019-11-21 | Stop reason: SURG

## 2019-11-21 RX ORDER — LIDOCAINE HYDROCHLORIDE 20 MG/ML
INJECTION, SOLUTION EPIDURAL; INFILTRATION; INTRACAUDAL; PERINEURAL PRN
Status: DISCONTINUED | OUTPATIENT
Start: 2019-11-21 | End: 2019-11-21 | Stop reason: SURG

## 2019-11-21 RX ORDER — BACITRACIN 50000 [IU]/1
INJECTION, POWDER, FOR SOLUTION INTRAMUSCULAR
Status: DISCONTINUED | OUTPATIENT
Start: 2019-11-21 | End: 2019-11-21 | Stop reason: HOSPADM

## 2019-11-21 RX ORDER — GABAPENTIN 300 MG/1
300 CAPSULE ORAL ONCE
Status: COMPLETED | OUTPATIENT
Start: 2019-11-21 | End: 2019-11-21

## 2019-11-21 RX ORDER — ACETAMINOPHEN 500 MG
1000 TABLET ORAL ONCE
Status: COMPLETED | OUTPATIENT
Start: 2019-11-21 | End: 2019-11-21

## 2019-11-21 RX ORDER — HYDROMORPHONE HYDROCHLORIDE 1 MG/ML
0.1 INJECTION, SOLUTION INTRAMUSCULAR; INTRAVENOUS; SUBCUTANEOUS
Status: DISCONTINUED | OUTPATIENT
Start: 2019-11-21 | End: 2019-11-21 | Stop reason: HOSPADM

## 2019-11-21 RX ORDER — CEPHALEXIN 500 MG/1
500 CAPSULE ORAL 2 TIMES DAILY
Refills: 0 | COMMUNITY
Start: 2019-10-30

## 2019-11-21 RX ADMIN — FENTANYL CITRATE 50 MCG: 50 INJECTION, SOLUTION INTRAMUSCULAR; INTRAVENOUS at 08:19

## 2019-11-21 RX ADMIN — LIDOCAINE HYDROCHLORIDE 100 MG: 20 INJECTION, SOLUTION EPIDURAL; INFILTRATION; INTRACAUDAL; PERINEURAL at 08:01

## 2019-11-21 RX ADMIN — ACETAMINOPHEN 1000 MG: 500 TABLET, FILM COATED ORAL at 07:23

## 2019-11-21 RX ADMIN — CEFAZOLIN 3 G: 1 INJECTION, POWDER, FOR SOLUTION INTRAVENOUS at 08:15

## 2019-11-21 RX ADMIN — SODIUM CHLORIDE, POTASSIUM CHLORIDE, SODIUM LACTATE AND CALCIUM CHLORIDE: 600; 310; 30; 20 INJECTION, SOLUTION INTRAVENOUS at 07:15

## 2019-11-21 RX ADMIN — FENTANYL CITRATE 100 MCG: 50 INJECTION, SOLUTION INTRAMUSCULAR; INTRAVENOUS at 08:00

## 2019-11-21 RX ADMIN — PROPOFOL 200 MG: 10 INJECTION, EMULSION INTRAVENOUS at 08:01

## 2019-11-21 RX ADMIN — FENTANYL CITRATE 50 MCG: 50 INJECTION, SOLUTION INTRAMUSCULAR; INTRAVENOUS at 09:12

## 2019-11-21 RX ADMIN — GABAPENTIN 300 MG: 300 CAPSULE ORAL at 07:24

## 2019-11-21 RX ADMIN — DEXAMETHASONE SODIUM PHOSPHATE 4 MG: 4 INJECTION, SOLUTION INTRAMUSCULAR; INTRAVENOUS at 08:20

## 2019-11-21 RX ADMIN — SUCCINYLCHOLINE CHLORIDE 160 MG: 20 INJECTION, SOLUTION INTRAMUSCULAR; INTRAVENOUS at 08:01

## 2019-11-21 RX ADMIN — OXYCODONE HYDROCHLORIDE 10 MG: 5 SOLUTION ORAL at 10:06

## 2019-11-21 RX ADMIN — FENTANYL CITRATE 50 MCG: 50 INJECTION, SOLUTION INTRAMUSCULAR; INTRAVENOUS at 08:27

## 2019-11-21 NOTE — OR NURSING
1033 patient to stage 2  Patient settled in recliner chair post short ambulation from [facility] - pt dressed with assist by CNA. Dressing to lower mid back CDI with abdominal binder in place. Pain rated as tolerable and denies nausea. Pt diabetic and reports feeling asymptomatic and ready to go home. Pt states has accucheck at home and will check BS and treat as instructed at home as normal.  1105 Discharge instructions reviewed by Clari, RN with patient/family and RX given by Clari to patient.   1115 D/Grupo to care of family post uneventful stay in PACU 2.

## 2019-11-21 NOTE — ANESTHESIA PREPROCEDURE EVALUATION
Relevant Problems   CARDIAC   (+) Essential hypertension      ENDO   (+) Type 2 diabetes mellitus with hyperglycemia, with long-term current use of insulin (HCC)      Other   (+) Chronic bilateral low back pain without sciatica   (+) Diabetic gastroparesis (HCC)   (+) Morbid obesity with BMI of 40.0-44.9, adult (HCC)   (+) Peripheral polyneuropathy   (+) Uncomplicated opioid dependence (HCC)       Physical Exam    Airway   Mallampati: III  TM distance: >3 FB  Neck ROM: full       Cardiovascular - normal exam  Rhythm: regular  Rate: normal  (-) murmur     Dental   Comments: Missing front upper tooth       Pulmonary - normal exam  Breath sounds clear to auscultation     Abdominal    Neurological - normal exam               Anesthesia Plan    ASA 3   ASA physical status 3 criteria: morbid obesity - BMI greater than or equal to 40    Plan - general       Airway plan will be ETT        Induction: intravenous    Postoperative Plan: Postoperative administration of opioids is intended.    Pertinent diagnostic labs and testing reviewed    Informed Consent:    Anesthetic plan and risks discussed with patient.    Use of blood products discussed with: patient whom consented to blood products.

## 2019-11-21 NOTE — ANESTHESIA POSTPROCEDURE EVALUATION
Patient: Dakota Cisneros    Procedure Summary     Date:  11/21/19 Room / Location:   OR  / SURGERY [facility]    Anesthesia Start:  0757 Anesthesia Stop:      Procedure:  INSERTION, NEUROSTIMULATOR, PERMANENT, SPINAL CORD Diagnosis:  (FAILED BACK SYNDROME, LUMBAR SPONDYLOSIS)    Surgeon:  Juan Schwartz M.D. Responsible Provider:  Letty Scott M.D.    Anesthesia Type:  general ASA Status:  3          Final Anesthesia Type: general  Last vitals  BP   Blood Pressure: 119/70    Temp   37.2 °C (99 °F)    Pulse   Pulse: 90   Resp   20    SpO2   96 %      Anesthesia Post Evaluation    Patient location during evaluation: PACU  Patient participation: complete - patient participated  Level of consciousness: awake and alert    Airway patency: patent  Anesthetic complications: no  Cardiovascular status: hemodynamically stable  Respiratory status: acceptable  Hydration status: euvolemic    PONV: none           Nurse Pain Score: 0 (NPRS)

## 2019-11-21 NOTE — ANESTHESIA TIME REPORT
Anesthesia Start and Stop Event Times     Date Time Event    11/21/2019 0741 Ready for Procedure     0757 Anesthesia Start     0921 Anesthesia Stop        Responsible Staff  11/21/19    Name Role Begin End    Letty Scott M.D. Anesth 0757 0921        Preop Diagnosis (Free Text):  Pre-op Diagnosis     FAILED BACK SYNDROME, LUMBAR SPONDYLOSIS        Preop Diagnosis (Codes):    Post op Diagnosis  Failed back syndrome  lumbar spondylosis    Premium Reason  Non-Premium    Comments:

## 2019-11-21 NOTE — DISCHARGE INSTRUCTIONS
`  ACTIVITY: Rest and take it easy for the first 24 hours.  A responsible adult is recommended to remain with you during that time.  It is normal to feel sleepy.  We encourage you to not do anything that requires balance, judgment or coordination.    MILD FLU-LIKE SYMPTOMS ARE NORMAL. YOU MAY EXPERIENCE GENERALIZED MUSCLE ACHES, THROAT IRRITATION, HEADACHE AND/OR SOME NAUSEA.    FOR 24 HOURS DO NOT:  Drive, operate machinery or run household appliances.  Drink beer or alcoholic beverages.   Make important decisions or sign legal documents.    SPECIAL INSTRUCTIONS: *Take Keflex as directed **    DIET: To avoid nausea, slowly advance diet as tolerated, avoiding spicy or greasy foods for the first day.  Add more substantial food to your diet according to your physician's instructions.  Babies can be fed formula or breast milk as soon as they are hungry.  INCREASE FLUIDS AND FIBER TO AVOID CONSTIPATION.    SURGICAL DRESSING/BATHING: *Do not remove surgical dressing and abdominal binder for 3 days. Do not shower for 3 days. May shower on November 24th in the afternoon. Do not get surgical dressing wet. Ice 20 mins on, 20 mins off. **    FOLLOW-UP APPOINTMENT:  A follow-up appointment should be arranged with your doctor in *10-14 days**; call to schedule.    You should CALL YOUR PHYSICIAN if you develop:  Fever greater than 101 degrees F.  Pain not relieved by medication, or persistent nausea or vomiting.  Excessive bleeding (blood soaking through dressing) or unexpected drainage from the wound.  Extreme redness or swelling around the incision site, drainage of pus or foul smelling drainage.  Inability to urinate or empty your bladder within 8 hours.  Problems with breathing or chest pain.    You should call 911 if you develop problems with breathing or chest pain.  If you are unable to contact your doctor or surgical center, you should go to the nearest emergency room or urgent care center.  Physician's telephone #:  *[phone]**    If any questions arise, call your doctor.  If your doctor is not available, please feel free to call the Surgical Center at [phone].  The Center is open Monday through Friday from 7AM to 7PM.  You can also call the HEALTH HOTLINE open 24 hours/day, 7 days/week and speak to a nurse at [phone], or toll free at [phone].    A registered nurse may call you a few days after your surgery to see how you are doing after your procedure.    MEDICATIONS: Resume taking daily medication.  Take prescribed pain medication with food.  If no medication is prescribed, you may take non-aspirin pain medication if needed.  PAIN MEDICATION CAN BE VERY CONSTIPATING.  Take a stool softener or laxative such as senokot, pericolace, or milk of magnesia if needed.    Prescription given for *Keflex**.  Last pain medication given at *Oxycodone 10mg at 1000**.    If your physician has prescribed pain medication that includes Acetaminophen (Tylenol), do not take additional Acetaminophen (Tylenol) while taking the prescribed medication.    Depression / Suicide Risk    As you are discharged from this [facility] facility, it is important to learn how to keep safe from harming yourself.    Recognize the warning signs:  · Abrupt changes in personality, positive or negative- including increase in energy   · Giving away possessions  · Change in eating patterns- significant weight changes-  positive or negative  · Change in sleeping patterns- unable to sleep or sleeping all the time   · Unwillingness or inability to communicate  · Depression  · Unusual sadness, discouragement and loneliness  · Talk of wanting to die  · Neglect of personal appearance   · Rebelliousness- reckless behavior  · Withdrawal from people/activities they love  · Confusion- inability to concentrate     If you or a loved one observes any of these behaviors or has concerns about self-harm, here's what you can do:  · Talk about it- your feelings  and reasons for harming yourself  · Remove any means that you might use to hurt yourself (examples: pills, rope, extension cords, firearm)  · Get professional help from the community (Mental Health, Substance Abuse, psychological counseling)  · Do not be alone:Call your Safe Contact- someone whom you trust who will be there for you.  · Call your local CRISIS HOTLINE 415-7762 or 314-798-7990  · Call your local Children's Mobile Crisis Response Team [place] (450) 002-5781 or www.[employer]  · Call the toll free National Suicide Prevention Hotlines   · National Suicide Prevention Lifeline 333-121-EPLQ (1193)  · National Hope Line Network 800-SUICIDE (772-1544)

## 2019-11-21 NOTE — ANESTHESIA QCDR
2019 [facility] Clinical Data Registry (for Quality Improvement)     Postoperative nausea/vomiting risk protocol (Adult = 18 yrs and Pediatric 3-17 yrs)- (430 and 463)  General inhalation anesthetic (NOT TIVA) with PONV risk factors: Yes  Provision of anti-emetic therapy with at least 2 different classes of agents: Yes   Patient DID NOT receive anti-emetic therapy and reason is documented in Medical Record:  N/A    Multimodal Pain Management- (AQI59)  Patient undergoing Elective Surgery (i.e. Outpatient, or ASC, or Prescheduled Surgery prior to Hospital Admission): Yes  Use of Multimodal Pain Management, two or more drugs and/or interventions, NOT including systemic opioids: Yes   Exception: Documented allergy to multiple classes of analgesics:  N/A    PACU assessment of acute postoperative pain prior to Anesthesia Care End- Applies to Patients Age = 18- (ABG7)  Initial PACU pain score is which of the following: < 7/10  Patient unable to report pain score: N/A    Post-anesthetic transfer of care checklist/protocol to PACU/ICU- (426 and 427)  Upon conclusion of case, patient transferred to which of the following locations: PACU/Non-ICU  Use of transfer checklist/protocol: Yes  Exclusion: Service Performed in Patient Hospital Room (and thus did not require transfer): N/A    PACU Reintubation- (AQI31)  General anesthesia requiring endotracheal intubation (ETT) along with subsequent extubation in OR or PACU: Yes  Required reintubation in the PACU: No   Extubation was a planned trial documented in the medical record prior to removal of the original airway device:  N/A    Unplanned admission to ICU related to anesthesia service up through end of PACU care- (MD51)  Unplanned admission to ICU (not initially anticipated at anesthesia start time): No         
(2) assistive person

## 2019-11-21 NOTE — ANESTHESIA PROCEDURE NOTES
Airway  Date/Time: 11/21/2019 8:02 AM  Performed by: Letty Scott M.D.  Authorized by: Letty Scott M.D.     Location:  OR  Urgency:  Elective  Difficult Airway: No    Indications for Airway Management:  Anesthesia  Spontaneous Ventilation: absent    Sedation Level:  Deep  Preoxygenated: Yes    Patient Position:  Sniffing  Mask Difficulty Assessment:  0 - not attempted  Final Airway Type:  Endotracheal airway  Final Endotracheal Airway:  ETT  Cuffed: Yes    Technique Used for Successful ETT Placement:  Video laryngoscopy  Devices/Methods Used in Placement:  Intubating stylet  Insertion Site:  Oral  Blade Type:  Glide  Laryngoscope Blade/Videolaryngoscope Blade Size:  4  ETT Size (mm):  8.0  Measured from:  Teeth  ETT to Teeth (cm):  24  Placement Verified by: auscultation and capnometry    Cormack-Lehane Classification:  Grade I - full view of glottis  Number of Attempts at Approach:  1

## 2019-11-21 NOTE — OR NURSING
0901 Report received from Clari RN, Pt repositioned for comfort. Currently denies need for pain medication, no nausea. 1000 Pt dozing intermittent, c/o pinching sensation to back, rx given. 1020 Pt meets criteria for transfer to stage 2, no nausea and tolerating sips of water. 1030 No assessment changes.

## 2019-11-21 NOTE — OR NURSING
0633: Brought patient back to pre-op and assumed care.  0716: Patient allergies and NPO status verified, home medication reconciliation completed and belongings secured. Patient verbalizes understanding of pain scale, expected course of stay and plan of care. Surgical site verified with patient. IV access established. Sequentials placed on legs.   Cigarettes

## 2019-11-21 NOTE — OR NURSING
0919: To PACU from OR via gurney, respirations spontaneous and non-labored, abdominal binder in place, dressing CDI. No pain or nausea.  0929: Still no pain or nausea, sleepy, but arouses to voice and touch, no pain or nausea. Tolerating decrease of supplemental O2.  1935: Report given to ELBA Santana and she assumed care.

## 2019-11-25 NOTE — OP REPORT
surgeon: Juan Schwartz MD    Assistants: None    Preoperative diagnosis: Chronic Pain syndrome, Failed Back Surgery Syndrome    Postoperative diagnosis: Chronic Pain syndrome, Failed Back Surgery Syndrome    Type of Anesthesia: General Anesthesia with ETT  Procedure:  1. Insertion of a Permanent Midline Octrode Spinal Cord Stimulator Lead  2. Insertion of a Permanent Left Octrode Spinal Cord Stimulator Lead  3. Intraoperative Neurostimulation Trial  4. Creation of left Implantable Power Generator (IPG) Pocket  5. Implantation of the IPG Nevro    Method of Surgery: After discussing risks, benefits, and alternatives to the procedure, the patient expressed understanding and wished to proceed. An IV was started in the pre-op area and IV fluid administration was continued throughout the procedure. The patient was brought into the fluoroscopy suite and placed in the prone position. Procedural pause was conducted to verify: correct patient identity, procedure to be performed and as applicable, correct side and site, correct patient position, and availability of implants, special equipment or special instruments. Intravenous sedation appropriate to the procedure was administered by the physician/nurse and is accurately reflected in the nurse's notes. Blood pressure, heart rate, pulse oximetry, and cardiac monitoring were monitored throughout the procedure. The patient's vital signs remained stable throughout the procedure. EKG monitoring revealed normal sinus rhythm. A dose of Ancef was administered intravenously prior to starting the procedure.    The skin was sterilely prepped and draped in the usual fashion using chlorhexidine times three in the region that the procedure was to be performed.    Insertion of a Permanent Midline Octrode Spinal Cord Stimulator Lead    The left T12 - L1 interlaminar space was identified fluoroscopically. The skin and subcutaneous tissues overlying the left L1 lamina were anesthetized with 1%  Lidocaine without epinephrine using a 25G 1.5 inch needle and a 25G 3.5 inch spinal needle for deeper tissues. Then the 25 gauge 3.5 inch spinal needle was used to inject 0.5% bupivacaine with epinephrine for further local anesthetic control and hemostasis. A 10-blade scalpel was used to make a 5 cm midline incision overlying the L1 spinous process to the L4 spinous process. Dissection was carried out with bovie to expose the bilateral interlaminar ligaments. After blunt dissection was completed, the incision was copiously irrigated with bacitracin solution. A 14-gauge 4.5 inch Tuohy epidural needle was inserted through the incision to the left L1 lamina and was then “walked off” the superior aspect of this lamina into the T12-L1 epidural space. The epidural space was localized using a loss of resistance technique and intermittent fluoroscopic    guidance. An octrode spinal cord stimulator lead was then advanced up the midline of the posterior epidural space to the top of T8 vertebra. The 14-gauge 4.5 inch Tuohy epidural needle was then removed using a push-pull technique under direct fluoroscopic visualization to make sure the lead did not move. The proximal portion of the lead was anchored to the right interlaminar ligament using standard technique with interrupted 2-0 ethiobond sutures and a torpedo anchor.    The left T12-L1 interlaminar space was identified fluoroscopically. The skin and subcutaneous tissues overlying the left L1 lamina were anesthetized with 1% Lidocaine without epinephrine using a 25G 1.5 inch needle and a 25G 3.5 inch spinal needle for deeper tissues. A 14-gauge 4.5 inch Tuohy epidural needle was inserted through the skin to the left L1 lamina and was then “walked off” the superior aspect of this lamina into the T12-L1 epidural space. The epidural space was localized using a loss of resistance technique and intermittent fluoroscopic guidance. An octrode spinal cord stimulator lead was then  advanced up the left side of the posterior epidural space to the top of T9 vertebra. The 14-gauge 4.5 inch Tuohy epidural needle was then removed using a push-pull technique under direct fluoroscopic visualization to make sure the lead did not move and the end of the lead was pulled back into the incision side through the skin. The proximal portion of the lead was anchored to the left interlaminar ligament using standard technique with interrupted 2-0 ethibond sutures and a torpedo anchor.    Intraoperative Neurostimulation Trial    At this point, the distal ends of the leads were inserted and locked into IPG. Impedance was checked and multiple electrode combinations were utilized to provide coverage of the patient's area of pain.      Creation of a left IPG Pocket    The subcutaneous tissues overlying the left lateral of midline incision was anesthetized with 1% Lidocaine without epinephrine using a 25G 1.5 inch needle. Dissection was made with blunt technique, kwaku and bovie. Hemostasis was achieved. A generous subcutaneous pocket was created using blunt dissection that was approximately 2 cm deep to the skin's surface. After it was completed, the pocket was copiously irrigated with bacitracin solution and packed with sterile 4x4's.    The left spinal cord stimulator lead was connected to the inferior connector of the IPG. The right  spinal cord stimulator lead was connected to the superior connector of the IPG. After connection to the IPG, the system was activated confirming that the system was operational.     Implantation of the IPG    After the distal ends of the spinal cord stimulator leads were placed into the IPG, the sterile 4x4's in the IPG pocket were removed. The remaining length of the spinal cord stimulator leads were coiled underneath the IPG and the IPG was placed into the pocket. A sponge count was performed and all sponges were accounted for. 2-0 Vicryl was used to close the subcutaneous tissues  of the pocket and midline incision with interrupted sutures. The skin of both incisions were closed using a staples An AP spot film was obtained to record the final lead positions. one gram of vancomycin was applied inside the pocket.    The patient was seen in recovery and the patient received good stimulation that covered their targeted pain. The patient was instructed in the proper use of the  and understood its use prior to discharge. The patient tolerated the procedure well and there were no apparent complications. After an appropriate amount of observation, the patient was dismissed from the clinic in good condition under their own power.    Complications: None  Disposition:  1. The patient was discharged to the recovery area in good condition.  2. Discharge instructions were provided and explained.  3. Patient was instructed to call with any questions or problems.  4. Apply ice to the procedure site and keep clean and dry for 24 hours.  5. Medications were reviewed for efficacy and appropriateness.  6. Continue current medications.  7. Return in 1 to 2 weeks for follow up.
